# Patient Record
Sex: MALE | Race: WHITE | NOT HISPANIC OR LATINO | Employment: UNEMPLOYED | URBAN - METROPOLITAN AREA
[De-identification: names, ages, dates, MRNs, and addresses within clinical notes are randomized per-mention and may not be internally consistent; named-entity substitution may affect disease eponyms.]

---

## 2021-04-07 ENCOUNTER — HISTORICAL (OUTPATIENT)
Dept: RADIOLOGY | Facility: HOSPITAL | Age: 36
End: 2021-04-07

## 2021-05-12 ENCOUNTER — HISTORICAL (OUTPATIENT)
Dept: ADMINISTRATIVE | Facility: HOSPITAL | Age: 36
End: 2021-05-12

## 2022-04-11 ENCOUNTER — HISTORICAL (OUTPATIENT)
Dept: ADMINISTRATIVE | Facility: HOSPITAL | Age: 37
End: 2022-04-11

## 2022-04-26 VITALS
SYSTOLIC BLOOD PRESSURE: 121 MMHG | HEIGHT: 72 IN | BODY MASS INDEX: 29.68 KG/M2 | WEIGHT: 219.13 LBS | DIASTOLIC BLOOD PRESSURE: 87 MMHG

## 2022-07-15 ENCOUNTER — HOSPITAL ENCOUNTER (EMERGENCY)
Facility: HOSPITAL | Age: 37
Discharge: PSYCHIATRIC OR SUBSTANCE USE FACILITY | End: 2022-07-16
Attending: EMERGENCY MEDICINE
Payer: MEDICAID

## 2022-07-15 DIAGNOSIS — R45.851 SUICIDAL IDEATION: Primary | ICD-10-CM

## 2022-07-15 LAB
ALBUMIN SERPL-MCNC: 4.1 GM/DL (ref 3.5–5)
ALBUMIN/GLOB SERPL: 1.2 RATIO (ref 1.1–2)
ALP SERPL-CCNC: 56 UNIT/L (ref 40–150)
ALT SERPL-CCNC: 35 UNIT/L (ref 0–55)
AMPHET UR QL SCN: POSITIVE
APAP SERPL-MCNC: <17.4 UG/ML (ref 17.4–30)
APPEARANCE UR: CLEAR
AST SERPL-CCNC: 39 UNIT/L (ref 5–34)
BACTERIA #/AREA URNS AUTO: ABNORMAL /HPF
BARBITURATE SCN PRESENT UR: NEGATIVE
BASOPHILS # BLD AUTO: 0.08 X10(3)/MCL (ref 0–0.2)
BASOPHILS NFR BLD AUTO: 1 %
BENZODIAZ UR QL SCN: NEGATIVE
BILIRUB UR QL STRIP.AUTO: NEGATIVE MG/DL
BILIRUBIN DIRECT+TOT PNL SERPL-MCNC: 0.5 MG/DL
BUN SERPL-MCNC: 13.2 MG/DL (ref 8.9–20.6)
CALCIUM SERPL-MCNC: 9.3 MG/DL (ref 8.4–10.2)
CANNABINOIDS UR QL SCN: NEGATIVE
CHLORIDE SERPL-SCNC: 111 MMOL/L (ref 98–107)
CO2 SERPL-SCNC: 18 MMOL/L (ref 22–29)
COCAINE UR QL SCN: NEGATIVE
COLOR UR AUTO: ABNORMAL
CREAT SERPL-MCNC: 0.9 MG/DL (ref 0.73–1.18)
EOSINOPHIL # BLD AUTO: 0.1 X10(3)/MCL (ref 0–0.9)
EOSINOPHIL NFR BLD AUTO: 1.2 %
ERYTHROCYTE [DISTWIDTH] IN BLOOD BY AUTOMATED COUNT: 12.9 % (ref 11.5–17)
ETHANOL SERPL-MCNC: <10 MG/DL
FENTANYL UR QL SCN: POSITIVE
GLOBULIN SER-MCNC: 3.4 GM/DL (ref 2.4–3.5)
GLUCOSE SERPL-MCNC: 91 MG/DL (ref 74–100)
GLUCOSE UR QL STRIP.AUTO: NEGATIVE MG/DL
HCT VFR BLD AUTO: 38.9 % (ref 42–52)
HGB BLD-MCNC: 13.3 GM/DL (ref 14–18)
IMM GRANULOCYTES # BLD AUTO: 0.03 X10(3)/MCL (ref 0–0.04)
IMM GRANULOCYTES NFR BLD AUTO: 0.4 %
KETONES UR QL STRIP.AUTO: ABNORMAL MG/DL
LEUKOCYTE ESTERASE UR QL STRIP.AUTO: NEGATIVE UNIT/L
LYMPHOCYTES # BLD AUTO: 2.32 X10(3)/MCL (ref 0.6–4.6)
LYMPHOCYTES NFR BLD AUTO: 28.2 %
MCH RBC QN AUTO: 30.3 PG (ref 27–31)
MCHC RBC AUTO-ENTMCNC: 34.2 MG/DL (ref 33–36)
MCV RBC AUTO: 88.6 FL (ref 80–94)
MDMA UR QL SCN: NEGATIVE
MONOCYTES # BLD AUTO: 0.71 X10(3)/MCL (ref 0.1–1.3)
MONOCYTES NFR BLD AUTO: 8.6 %
NEUTROPHILS # BLD AUTO: 5 X10(3)/MCL (ref 2.1–9.2)
NEUTROPHILS NFR BLD AUTO: 60.6 %
NITRITE UR QL STRIP.AUTO: NEGATIVE
NRBC BLD AUTO-RTO: 0 %
OPIATES UR QL SCN: NEGATIVE
PCP UR QL: NEGATIVE
PH UR STRIP.AUTO: 6 [PH]
PH UR: 6 [PH] (ref 3–11)
PLATELET # BLD AUTO: 227 X10(3)/MCL (ref 130–400)
PMV BLD AUTO: 9.5 FL (ref 7.4–10.4)
POTASSIUM SERPL-SCNC: 4.9 MMOL/L (ref 3.5–5.1)
PROT SERPL-MCNC: 7.5 GM/DL (ref 6.4–8.3)
PROT UR QL STRIP.AUTO: ABNORMAL MG/DL
RBC # BLD AUTO: 4.39 X10(6)/MCL (ref 4.7–6.1)
RBC #/AREA URNS AUTO: <5 /HPF
RBC UR QL AUTO: NEGATIVE UNIT/L
SALICYLATES SERPL-MCNC: <5 MG/DL
SARS-COV-2 RDRP RESP QL NAA+PROBE: NEGATIVE
SODIUM SERPL-SCNC: 141 MMOL/L (ref 136–145)
SP GR UR STRIP.AUTO: 1.02 (ref 1–1.03)
SPECIFIC GRAVITY, URINE AUTO (.000) (OHS): 1.02 (ref 1–1.03)
SQUAMOUS #/AREA URNS AUTO: <5 /HPF
TSH SERPL-ACNC: 0.18 UIU/ML (ref 0.35–4.94)
UROBILINOGEN UR STRIP-ACNC: 1 MG/DL
WBC # SPEC AUTO: 8.2 X10(3)/MCL (ref 4.5–11.5)
WBC #/AREA URNS AUTO: 6 /HPF

## 2022-07-15 PROCEDURE — 80307 DRUG TEST PRSMV CHEM ANLYZR: CPT | Performed by: NURSE PRACTITIONER

## 2022-07-15 PROCEDURE — 80143 DRUG ASSAY ACETAMINOPHEN: CPT | Performed by: NURSE PRACTITIONER

## 2022-07-15 PROCEDURE — 81001 URINALYSIS AUTO W/SCOPE: CPT | Performed by: NURSE PRACTITIONER

## 2022-07-15 PROCEDURE — 36415 COLL VENOUS BLD VENIPUNCTURE: CPT | Performed by: NURSE PRACTITIONER

## 2022-07-15 PROCEDURE — 99285 EMERGENCY DEPT VISIT HI MDM: CPT | Mod: 25

## 2022-07-15 PROCEDURE — 82077 ASSAY SPEC XCP UR&BREATH IA: CPT | Performed by: NURSE PRACTITIONER

## 2022-07-15 PROCEDURE — 87635 SARS-COV-2 COVID-19 AMP PRB: CPT | Performed by: EMERGENCY MEDICINE

## 2022-07-15 PROCEDURE — 85025 COMPLETE CBC W/AUTO DIFF WBC: CPT | Performed by: NURSE PRACTITIONER

## 2022-07-15 PROCEDURE — 84443 ASSAY THYROID STIM HORMONE: CPT | Performed by: NURSE PRACTITIONER

## 2022-07-15 PROCEDURE — 80053 COMPREHEN METABOLIC PANEL: CPT | Performed by: NURSE PRACTITIONER

## 2022-07-15 PROCEDURE — 80179 DRUG ASSAY SALICYLATE: CPT | Performed by: NURSE PRACTITIONER

## 2022-07-15 NOTE — FIRST PROVIDER EVALUATION
Medical screening exam completed.  I have conducted a focused provider triage encounter, findings are as follows:    Brief history of present illness:  States fall one week ago and neck pain. States suicidal ideations.     There were no vitals filed for this visit.    Pertinent physical exam:  Awake, alert, ambulatory    Brief workup plan:  labs    Preliminary workup initiated; this workup will be continued and followed by the physician or advanced practice provider that is assigned to the patient when roomed.

## 2022-07-15 NOTE — ED PROVIDER NOTES
"Encounter Date: 7/15/2022    SCRIBE #1 NOTE: I, Rachel Ceja, am scribing for, and in the presence of,  Zhao Ocampo. I have scribed the following portions of the note - Other sections scribed: HPI, ROS, physical exam.       History     Chief Complaint   Patient presents with    Suicidal    Neck Pain     Pt presents c/o SI.  Onset x1 week.  No plan.  Denies HI.  Polysubstance abuse, meth and heroin/nasal.  States last usuage x4 days.       38 y/o male presents to the ED with complaints of suicidal ideation. Pt reports that his "life sucks" and that he is addicted to meth and heroin. Pt reports that he last ingested meth and heroin 4 days ago. Pt's sister at bedside notes that he also abuses his prescription pain meds and that he falls frequently. She also notes that he has insomnia and neck pain.     The history is provided by the patient and a relative. No  was used.   General Illness   The current episode started just prior to arrival. Associated symptoms include neck pain. Pertinent negatives include no fever, no abdominal pain, no constipation, no diarrhea, no nausea, no vomiting, no congestion, no ear pain, no headaches, no cough, no shortness of breath, no wheezing, no rash and no discharge.     Review of patient's allergies indicates:  No Known Allergies  No past medical history on file.  No past surgical history on file.  No family history on file.     Review of Systems   Constitutional: Negative for chills and fever.   HENT: Negative for congestion and ear pain.    Eyes: Negative for discharge.   Respiratory: Negative for cough, shortness of breath and wheezing.    Cardiovascular: Negative for chest pain and leg swelling.   Gastrointestinal: Negative for abdominal pain, constipation, diarrhea, nausea and vomiting.   Genitourinary: Negative for dysuria, flank pain and frequency.   Musculoskeletal: Positive for neck pain. Negative for back pain and joint swelling.   Skin: " Negative for rash.   Neurological: Negative for dizziness, weakness and headaches.   Psychiatric/Behavioral: Positive for suicidal ideas. Negative for agitation, confusion and hallucinations.       Physical Exam     Initial Vitals [07/15/22 1419]   BP Pulse Resp Temp SpO2   127/78 93 14 98.8 °F (37.1 °C) 98 %      MAP       --         Physical Exam    Nursing note and vitals reviewed.  Constitutional: He appears well-developed and well-nourished. No distress.   HENT:   Head: Normocephalic and atraumatic.   Eyes: Conjunctivae are normal.   Cardiovascular: Normal rate.   Pulmonary/Chest: No respiratory distress. He has no wheezes. He has no rhonchi. He exhibits no tenderness.   Abdominal: Abdomen is soft. He exhibits no distension. There is no abdominal tenderness. There is no rebound and no guarding.   Musculoskeletal:         General: Normal range of motion.      Comments: No midline tenderness to C/T spine     Neurological: He is alert and oriented to person, place, and time. He has normal strength.   Skin: Skin is warm and dry.   Psychiatric:   +SI  Flat affect         ED Course   Procedures  Labs Reviewed   DRUG SCREEN, URINE (BEAKER) - Abnormal; Notable for the following components:       Result Value    Amphetamines, Urine Positive (*)     Fentanyl, Urine Positive (*)     All other components within normal limits    Narrative:     Cut off concentrations:    Amphetamines - 1000 ng/ml  Barbiturates - 200 ng/ml  Benzodiazepine - 200 ng/ml  Cannabinoids (THC) - 50 ng/ml  Cocaine - 300 ng/ml  Fentanyl - 1.0 ng/ml  MDMA - 500 ng/ml  Opiates - 300 ng/ml   Phencyclidine (PCP) - 25 ng/ml    Specimen submitted for drug analysis and tested for pH and specific gravity in order to evaluate sample integrity. Suspect tampering if specific gravity is <1.003 and/or pH is not within the range of 4.5 - 8.0  False negatives may result form substances such as bleach added to urine.  False positives may result for the presence of a  substance with similar chemical structure to the drug or its metabolite.    This test provides only a PRELIMINARY analytical test result. A more specific alternate chemical method must be used in order to obtain a confirmed analytical result. Gas chromatography/mass spectrometry (GC/MS) is the preferred confirmatory method. Other chemical confirmation methods are available. Clinical consideration and professional judgement should be applied to any drug of abuse test result, particularly when preliminary positive results are used.    Positive results will be confirmed only at the physicians request. Unconfirmed screening results are to be used only for medical purposes (treatment).        ACETAMINOPHEN LEVEL - Abnormal; Notable for the following components:    Acetaminophen Level <17.4 (*)     All other components within normal limits   COMPREHENSIVE METABOLIC PANEL - Abnormal; Notable for the following components:    Chloride 111 (*)     Carbon Dioxide 18 (*)     Aspartate Aminotransferase 39 (*)     All other components within normal limits   TSH - Abnormal; Notable for the following components:    Thyroid Stimulating Hormone 0.1817 (*)     All other components within normal limits   URINALYSIS, REFLEX TO URINE CULTURE - Abnormal; Notable for the following components:    Color, UA Dark Yellow (*)     Protein, UA Trace (*)     Ketones, UA 1+ (*)     All other components within normal limits   CBC WITH DIFFERENTIAL - Abnormal; Notable for the following components:    RBC 4.39 (*)     Hgb 13.3 (*)     Hct 38.9 (*)     All other components within normal limits   URINALYSIS, MICROSCOPIC - Abnormal; Notable for the following components:    WBC, UA 6 (*)     All other components within normal limits   ALCOHOL,MEDICAL (ETHANOL) - Normal   SARS-COV-2 RNA AMPLIFICATION, QUAL - Normal   CBC W/ AUTO DIFFERENTIAL    Narrative:     The following orders were created for panel order CBC Auto Differential.  Procedure                                Abnormality         Status                     ---------                               -----------         ------                     CBC with Differential[[id number]]        Abnormal            Final result                 Please view results for these tests on the individual orders.   SALICYLATE LEVEL          Imaging Results          CT Cervical Spine Without Contrast (Final result)  Result time 07/15/22 15:39:56    Final result by Lakeisha Camacho MD (07/15/22 15:39:56)                 Impression:      Loss of the normal lordotic curve of the cervical spine and torticollis to the left most likely related to spasm but otherwise unremarkable with no evidence of acute fracture or dislocation seen      Electronically signed by: Lakeisha Camacho  Date:    07/15/2022  Time:    15:39             Narrative:    EXAMINATION:  CT CERVICAL SPINE WITHOUT CONTRAST    CLINICAL HISTORY:  Neck trauma, abnormal mental status or neuro exam (Ped 3-15y);    TECHNIQUE:  Low dose axial images, sagittal and coronal reformations were performed though the cervical spine.  Contrast was not administered.    COMPARISON:  None    FINDINGS:  The vertebral body heights are well maintained. There is some loss of the normal lordotic curve cervical spine with torticollis to the left most likely related to spasm. No fracture is seen. No dislocation is seen. The odontoid and lateral masses appear grossly unremarkable.                               CT Head Without Contrast (Final result)  Result time 07/15/22 15:41:35    Final result by Lakeisha Camacho MD (07/15/22 15:41:35)                 Impression:      No acute abnormality seen      Electronically signed by: Lakeisha Camacho  Date:    07/15/2022  Time:    15:41             Narrative:    EXAMINATION:  CT HEAD WITHOUT CONTRAST    CLINICAL HISTORY:  Head trauma, moderate-severe;    TECHNIQUE:  Multiple axial images were obtained from the base of the brain to the  vertex without contrast administration.  Sagittal and coronal reconstructions were performed..    COMPARISON:  [facility]    FINDINGS:  There is no intracranial mass or lesion seen.  No hemorrhage is seen.  No acute infarct is seen.  There are some punctate areas of lacunar infarct in the basal ganglia bilaterally the ventricles and basilar cisterns appear normal.  Brain parenchyma appears grossly unremarkable.    Posterior fossa appears normal.  The calvarium is intact.  The paranasal sinuses appear grossly unremarkable.                               X-Ray Cervical Spine 2 or 3 Views (Final result)  Result time 07/15/22 15:34:54    Final result by Nikkie Covington MD (07/15/22 15:34:54)                 Impression:      No acute abnormality identified.      Electronically signed by: Nikkie Covington  Date:    07/15/2022  Time:    15:34             Narrative:    EXAMINATION:  XR CERVICAL SPINE 2 OR 3 VIEWS    CLINICAL HISTORY:  fall;    COMPARISON:  None.    FINDINGS:  There is reversal of normal cervical lordosis.  The vertebral body heights are maintained.  There is mild disc height loss at C5-C7 small multilevel marginal osteophytes.  The soft tissues are unremarkable.                                 Medications - No data to display             Attending Attestation:           Physician Attestation for Scribe:  Physician Attestation Statement for Scribe #1: I, Zhao Ocampo, reviewed documentation, as scribed by Rachel Ceja in my presence, and it is both accurate and complete.                 Medically cleared for psychiatry placement: 7/15/2022  7:25 PM    Clinical Impression:   Final diagnoses:  [R45.851] Suicidal ideation (Primary)          ED Disposition Condition    Transfer to Psych Facility         ED Prescriptions     None        Follow-up Information    None          Zhao Ocampo MD  07/15/22 1925

## 2022-07-16 VITALS
BODY MASS INDEX: 24.65 KG/M2 | RESPIRATION RATE: 16 BRPM | WEIGHT: 180 LBS | HEART RATE: 78 BPM | TEMPERATURE: 98 F | SYSTOLIC BLOOD PRESSURE: 151 MMHG | DIASTOLIC BLOOD PRESSURE: 91 MMHG | OXYGEN SATURATION: 98 %

## 2022-07-16 PROCEDURE — 25000003 PHARM REV CODE 250: Performed by: EMERGENCY MEDICINE

## 2022-07-16 RX ORDER — LORAZEPAM 1 MG/1
1 TABLET ORAL
Status: COMPLETED | OUTPATIENT
Start: 2022-07-16 | End: 2022-07-16

## 2022-07-16 RX ADMIN — LORAZEPAM 1 MG: 1 TABLET ORAL at 12:07

## 2022-07-18 NOTE — PROGRESS NOTES
Miguelina with [facility] called inquiring if patient had follow up recommendations. Advised no follow up appointments listed.

## 2022-08-07 ENCOUNTER — HOSPITAL ENCOUNTER (INPATIENT)
Facility: HOSPITAL | Age: 37
LOS: 5 days | Discharge: HOME | DRG: 897 | End: 2022-08-12
Attending: PSYCHIATRY & NEUROLOGY | Admitting: PSYCHIATRY & NEUROLOGY
Payer: MEDICAID

## 2022-08-07 DIAGNOSIS — F32.A DEPRESSION: ICD-10-CM

## 2022-08-07 PROBLEM — I10 ESSENTIAL HYPERTENSION: Chronic | Status: ACTIVE | Noted: 2022-08-07

## 2022-08-07 PROCEDURE — 11400000 HC PSYCH PRIVATE ROOM

## 2022-08-07 PROCEDURE — S4991 NICOTINE PATCH NONLEGEND: HCPCS | Performed by: PSYCHIATRY & NEUROLOGY

## 2022-08-07 PROCEDURE — 25000003 PHARM REV CODE 250: Performed by: PEDIATRICS

## 2022-08-07 PROCEDURE — 25000003 PHARM REV CODE 250: Performed by: PSYCHIATRY & NEUROLOGY

## 2022-08-07 RX ORDER — LOPERAMIDE HYDROCHLORIDE 2 MG/1
2 CAPSULE ORAL EVERY 4 HOURS PRN
Status: DISCONTINUED | OUTPATIENT
Start: 2022-08-07 | End: 2022-08-12 | Stop reason: HOSPADM

## 2022-08-07 RX ORDER — ADHESIVE BANDAGE
30 BANDAGE TOPICAL DAILY PRN
Status: DISCONTINUED | OUTPATIENT
Start: 2022-08-07 | End: 2022-08-12 | Stop reason: HOSPADM

## 2022-08-07 RX ORDER — ONDANSETRON 4 MG/1
8 TABLET, ORALLY DISINTEGRATING ORAL EVERY 8 HOURS PRN
Status: DISCONTINUED | OUTPATIENT
Start: 2022-08-07 | End: 2022-08-12 | Stop reason: HOSPADM

## 2022-08-07 RX ORDER — LORAZEPAM 2 MG/ML
2 INJECTION INTRAMUSCULAR EVERY 6 HOURS PRN
Status: DISCONTINUED | OUTPATIENT
Start: 2022-08-07 | End: 2022-08-12 | Stop reason: HOSPADM

## 2022-08-07 RX ORDER — HYDROXYZINE HYDROCHLORIDE 50 MG/1
50 TABLET, FILM COATED ORAL EVERY 4 HOURS PRN
Status: DISCONTINUED | OUTPATIENT
Start: 2022-08-07 | End: 2022-08-12 | Stop reason: HOSPADM

## 2022-08-07 RX ORDER — MAG HYDROX/ALUMINUM HYD/SIMETH 200-200-20
30 SUSPENSION, ORAL (FINAL DOSE FORM) ORAL EVERY 6 HOURS PRN
Status: DISCONTINUED | OUTPATIENT
Start: 2022-08-07 | End: 2022-08-12 | Stop reason: HOSPADM

## 2022-08-07 RX ORDER — LISINOPRIL 10 MG/1
20 TABLET ORAL DAILY
Status: DISCONTINUED | OUTPATIENT
Start: 2022-08-07 | End: 2022-08-08

## 2022-08-07 RX ORDER — ACETAMINOPHEN 325 MG/1
650 TABLET ORAL EVERY 6 HOURS PRN
Status: DISCONTINUED | OUTPATIENT
Start: 2022-08-07 | End: 2022-08-12 | Stop reason: HOSPADM

## 2022-08-07 RX ORDER — DICYCLOMINE HYDROCHLORIDE 10 MG/1
10 CAPSULE ORAL 3 TIMES DAILY PRN
Status: ACTIVE | OUTPATIENT
Start: 2022-08-07 | End: 2022-08-10

## 2022-08-07 RX ORDER — LORAZEPAM 1 MG/1
2 TABLET ORAL EVERY 6 HOURS PRN
Status: DISCONTINUED | OUTPATIENT
Start: 2022-08-07 | End: 2022-08-12 | Stop reason: HOSPADM

## 2022-08-07 RX ORDER — TRAZODONE HYDROCHLORIDE 100 MG/1
100 TABLET ORAL NIGHTLY PRN
Status: DISCONTINUED | OUTPATIENT
Start: 2022-08-07 | End: 2022-08-12 | Stop reason: HOSPADM

## 2022-08-07 RX ORDER — HALOPERIDOL 5 MG/ML
10 INJECTION INTRAMUSCULAR EVERY 6 HOURS PRN
Status: DISCONTINUED | OUTPATIENT
Start: 2022-08-07 | End: 2022-08-12 | Stop reason: HOSPADM

## 2022-08-07 RX ORDER — IBUPROFEN 200 MG
1 TABLET ORAL DAILY
Status: DISCONTINUED | OUTPATIENT
Start: 2022-08-07 | End: 2022-08-12 | Stop reason: HOSPADM

## 2022-08-07 RX ORDER — DIPHENHYDRAMINE HYDROCHLORIDE 50 MG/ML
50 INJECTION INTRAMUSCULAR; INTRAVENOUS EVERY 6 HOURS
Status: DISCONTINUED | OUTPATIENT
Start: 2022-08-07 | End: 2022-08-07

## 2022-08-07 RX ORDER — DIPHENHYDRAMINE HCL 50 MG
50 CAPSULE ORAL EVERY 6 HOURS PRN
Status: DISCONTINUED | OUTPATIENT
Start: 2022-08-07 | End: 2022-08-12 | Stop reason: HOSPADM

## 2022-08-07 RX ORDER — HALOPERIDOL 5 MG/1
10 TABLET ORAL EVERY 6 HOURS PRN
Status: DISCONTINUED | OUTPATIENT
Start: 2022-08-07 | End: 2022-08-12 | Stop reason: HOSPADM

## 2022-08-07 RX ORDER — PANTOPRAZOLE SODIUM 40 MG/1
40 TABLET, DELAYED RELEASE ORAL DAILY
Status: DISCONTINUED | OUTPATIENT
Start: 2022-08-07 | End: 2022-08-12 | Stop reason: HOSPADM

## 2022-08-07 RX ORDER — CLONIDINE HYDROCHLORIDE 0.1 MG/1
0.1 TABLET ORAL 3 TIMES DAILY
Status: DISPENSED | OUTPATIENT
Start: 2022-08-07 | End: 2022-08-10

## 2022-08-07 RX ADMIN — DIPHENHYDRAMINE HYDROCHLORIDE 50 MG: 50 CAPSULE ORAL at 05:08

## 2022-08-07 RX ADMIN — NICOTINE 1 PATCH: 21 PATCH, EXTENDED RELEASE TRANSDERMAL at 11:08

## 2022-08-07 RX ADMIN — ACETAMINOPHEN 325MG 650 MG: 325 TABLET ORAL at 05:08

## 2022-08-07 RX ADMIN — ONDANSETRON 8 MG: 4 TABLET, ORALLY DISINTEGRATING ORAL at 12:08

## 2022-08-07 RX ADMIN — HALOPERIDOL 10 MG: 5 TABLET ORAL at 05:08

## 2022-08-07 RX ADMIN — CLONIDINE HYDROCHLORIDE 0.1 MG: 0.1 TABLET ORAL at 11:08

## 2022-08-07 RX ADMIN — LISINOPRIL 20 MG: 10 TABLET ORAL at 08:08

## 2022-08-07 RX ADMIN — PANTOPRAZOLE SODIUM 40 MG: 40 TABLET, DELAYED RELEASE ORAL at 08:08

## 2022-08-07 RX ADMIN — LORAZEPAM 2 MG: 1 TABLET ORAL at 05:08

## 2022-08-07 RX ADMIN — TRAZODONE HYDROCHLORIDE 100 MG: 100 TABLET ORAL at 10:08

## 2022-08-07 RX ADMIN — CLONIDINE HYDROCHLORIDE 0.1 MG: 0.1 TABLET ORAL at 10:08

## 2022-08-07 NOTE — NURSING
Patient becoming more anxious and agitated.  De-escalation attempts repeatedly with no affect.  Patient demanding to leave and go to another facility because staff here doesn't know how to order the right medicine for detox ing people.  Patient getting louder and more vulgar.  Medicated with Benadryl 50 mg, Ativan 2 mg and Haldol 10mg orally     1800--Sleeping comfortably. No distress

## 2022-08-07 NOTE — H&P
[facility]  History & Physical    Subjective:      Chief Complaint/Reason for Admission: polysubstance abuse     Keshav Godfrey is a 37 y.o. male. Opiates and methamphetamine abuse for over six months now     Past Medical History:   Diagnosis Date    Bipolar 1 disorder      No past surgical history on file.  No family history on file.  Social History     Tobacco Use    Smoking status: Current Every Day Smoker     Types: Cigarettes   Substance Use Topics    Alcohol use: Not Currently    Drug use: Yes     Types: Methamphetamines     Comment: Methadone       No medications prior to admission.     Review of patient's allergies indicates:   Allergen Reactions    Buspirone     Codeine     Hydroxyzine     Penicillins         Review of Systems   Constitutional: Negative.    HENT: Negative.    Eyes: Negative.    Respiratory: Negative.    Cardiovascular: Negative.    Gastrointestinal: Negative.    Genitourinary: Negative.    Musculoskeletal: Negative.    Skin: Negative.    Neurological: Negative.    Endo/Heme/Allergies: Negative.    Psychiatric/Behavioral: Positive for depression and substance abuse. Negative for hallucinations and suicidal ideas.       Objective:      Vital Signs (Most Recent)  Temp: 97.7 °F (36.5 °C) (08/07/22 0358)  Pulse: 70 (08/07/22 0358)  Resp: 20 (08/07/22 0358)  BP: (!) 152/100 (08/07/22 0358)  SpO2: 99 % (08/07/22 0358)    Vital Signs Range (Last 24H):  Temp:  [97.7 °F (36.5 °C)-98.6 °F (37 °C)]   Pulse:  [70-93]   Resp:  [18-20]   BP: (142-152)/()   SpO2:  [98 %-99 %]     Physical Exam  HENT:      Head: Normocephalic.      Right Ear: Tympanic membrane normal.      Left Ear: Tympanic membrane normal.      Nose: Nose normal.      Mouth/Throat:      Mouth: Mucous membranes are moist.   Eyes:      Extraocular Movements: Extraocular movements intact.      Pupils: Pupils are equal, round, and reactive to light.   Cardiovascular:      Rate and  Rhythm: Normal rate and regular rhythm.   Pulmonary:      Effort: Pulmonary effort is normal.   Abdominal:      General: Abdomen is flat.   Musculoskeletal:         General: Normal range of motion.   Skin:     General: Skin is warm.   Neurological:      General: No focal deficit present.      Mental Status: He is alert and oriented to person, place, and time.      Comments: Vision normal   Hearing normal   EOM intact   Face muscles normal  Facial sensation normal   Shrugs shoulders  Tongue midline            Data Review:    Recent Results (from the past 48 hour(s))   Urinalysis, Reflex to Urine Culture Urine, Clean Catch    Collection Time: 08/06/22  6:56 PM    Specimen: Urine   Result Value Ref Range    Color, UA Dark Yellow (A) Yellow, Colorless, Other, Clear    Appearance, UA Cloudy (A) Clear    Specific Gravity, UA 1.029 1.001 - 1.030    pH, UA 5.5 5.0, 5.5, 6.0, 6.5, 7.0, 7.5, 8.0, 8.5    Protein, UA 2+ (A) Negative, 300  mg/dL    Glucose, UA 1+ (A) Negative, Normal mg/dL    Ketones, UA Trace (A) Negative, +1, +2, +3, +4, +5, >=160, >=80 mg/dL    Blood, UA 1+ (A) Negative unit/L    Bilirubin, UA 1+ (A) Negative mg/dL    Urobilinogen, UA 1.0 0.2, 1.0, Normal mg/dL    Nitrites, UA Negative Negative    Leukocyte Esterase, UA Negative Negative, 75  unit/L   Drug Screen, Urine    Collection Time: 08/06/22  6:56 PM   Result Value Ref Range    Amphetamines, Urine Positive (A) Negative    Barbituates, Urine Negative Negative    Benzodiazepine, Urine Negative Negative    Cannabinoids, Urine Positive (A) Negative    Cocaine, Urine Negative Negative    Fentanyl, Urine Positive (A) Negative    MDMA, Urine Negative Negative    Opiates, Urine Positive (A) Negative    Phencyclidine, Urine Negative Negative    pH, Urine 5.5 3.0 - 11.0    Specific Gravity, Urine Auto 1.029 1.001 - 1.035   Urinalysis, Microscopic    Collection Time: 08/06/22  6:56 PM   Result Value Ref Range    RBC, UA <5 <=5 /HPF    WBC, UA 43 (H) <=5 /HPF     Squamous Epithelial Cells, UA >36 (H) <=5 /HPF    Bacteria, UA None Seen None Seen, Rare, Occasional /HPF   Comprehensive metabolic panel    Collection Time: 08/06/22  7:04 PM   Result Value Ref Range    Sodium Level 138 136 - 145 mmol/L    Potassium Level 3.2 (L) 3.5 - 5.1 mmol/L    Chloride 105 98 - 107 mmol/L    Carbon Dioxide 20 (L) 22 - 29 mmol/L    Glucose Level 104 (H) 74 - 100 mg/dL    Blood Urea Nitrogen 15.3 8.9 - 20.6 mg/dL    Creatinine 1.69 (H) 0.73 - 1.18 mg/dL    Calcium Level Total 9.3 8.4 - 10.2 mg/dL    Protein Total 7.5 6.4 - 8.3 gm/dL    Albumin Level 4.5 3.5 - 5.0 gm/dL    Globulin 3.0 2.4 - 3.5 gm/dL    Albumin/Globulin Ratio 1.5 1.1 - 2.0 ratio    Bilirubin Total 1.0 <=1.5 mg/dL    Alkaline Phosphatase 90 40 - 150 unit/L    Alanine Aminotransferase 61 (H) 0 - 55 unit/L    Aspartate Aminotransferase 29 5 - 34 unit/L    Estimated GFR-Non  48 mls/min/1.73/m2   TSH    Collection Time: 08/06/22  7:04 PM   Result Value Ref Range    Thyroid Stimulating Hormone 1.7065 0.3500 - 4.9400 uIU/mL   Ethanol    Collection Time: 08/06/22  7:04 PM   Result Value Ref Range    Ethanol Level <10.0 <=10.0 mg/dL   Acetaminophen level    Collection Time: 08/06/22  7:04 PM   Result Value Ref Range    Acetaminophen Level <17.4 (L) 17.4 - 30.0 ug/ml   Salicylate level    Collection Time: 08/06/22  7:04 PM   Result Value Ref Range    Salicylate Level <5.0 mg/dL   CBC with Differential    Collection Time: 08/06/22  7:04 PM   Result Value Ref Range    WBC 9.6 4.5 - 11.5 x10(3)/mcL    RBC 4.78 4.70 - 6.10 x10(6)/mcL    Hgb 14.0 14.0 - 18.0 gm/dL    Hct 39.9 (L) 42.0 - 52.0 %    MCV 83.5 80.0 - 94.0 fL    MCH 29.3 27.0 - 31.0 pg    MCHC 35.1 33.0 - 36.0 mg/dL    RDW 12.8 11.5 - 17.0 %    Platelet 306 130 - 400 x10(3)/mcL    MPV 9.2 7.4 - 10.4 fL    Neut % 42.9 %    Lymph % 43.3 %    Mono % 11.6 %    Eos % 1.0 %    Basophil % 0.7 %    Lymph # 4.14 0.6 - 4.6 x10(3)/mcL    Neut # 4.1 2.1 - 9.2 x10(3)/mcL    Mono  # 1.11 0.1 - 1.3 x10(3)/mcL    Eos # 0.10 0 - 0.9 x10(3)/mcL    Baso # 0.07 0 - 0.2 x10(3)/mcL    IG# 0.05 (H) 0 - 0.04 x10(3)/mcL    IG% 0.5 %    NRBC% 0.0 %   COVID-19 Rapid Screening    Collection Time: 08/06/22 10:29 PM   Result Value Ref Range    SARS COV-2 MOLECULAR Negative Negative        No results found.       Assessment and Plan       Polysubstance abuse   Essential hypertension

## 2022-08-07 NOTE — NURSING
Pt admitted for SI and drug abuse. Pt presents to unit angry and hostile towards staff members. Refuses to sign consents or answer and admit questions at this time. Pt states he is angry because he was told that  He would be able to smoke on arrival to the unit. Will continue to monitor.

## 2022-08-07 NOTE — PLAN OF CARE
Problem: Adult Inpatient Plan of Care  Goal: Plan of Care Review  Outcome: Ongoing, Progressing  Goal: Patient-Specific Goal (Individualized)  Outcome: Ongoing, Progressing  Goal: Absence of Hospital-Acquired Illness or Injury  Outcome: Ongoing, Progressing  Goal: Optimal Comfort and Wellbeing  Outcome: Ongoing, Progressing  Goal: Readiness for Transition of Care  Outcome: Ongoing, Progressing     Problem: Activity and Energy Impairment (Depressive Signs/Symptoms)  Goal: Optimized Energy Level (Depressive Signs/Symptoms)  Outcome: Ongoing, Progressing     Problem: Cognitive Impairment (Depressive Signs/Symptoms)  Goal: Optimized Cognitive Function  Outcome: Ongoing, Progressing     Problem: Decreased Participation/Engagement (Depressive Signs/Symptoms)  Goal: Increased Participation and Engagement (Depressive Signs/Symptoms)  Outcome: Ongoing, Progressing     Problem: Feelings of Worthlessness, Hopelessness or Excessive Guilt (Depressive Signs/Symptoms)  Goal: Enhanced Self-Esteem and Confidence (Depressive Signs/Symptoms)  Outcome: Ongoing, Progressing     Problem: Mood Impairment (Depressive Signs/Symptoms)  Goal: Improved Mood Symptoms (Depressive Signs/Symptoms)  Outcome: Ongoing, Progressing     Problem: Nutrition Imbalance (Depressive Signs/Symptoms)  Goal: Optimized Nutrition Intake  Outcome: Ongoing, Progressing     Problem: Psychomotor Impairment (Depressive Signs/Symptoms)  Goal: Improved Psychomotor Symptoms (Depressive Signs/Symptoms)  Outcome: Ongoing, Progressing     Problem: Sleep Disturbance (Depressive Signs/Symptoms)  Goal: Improved Sleep (Depressive Signs/Symptoms)  Outcome: Ongoing, Progressing     Problem: Social, Occupational or Functional Impairment (Depressive Signs/Symptoms)  Goal: Enhanced Social, Occupational or Functional Skills (Depressive Signs/Symptoms)  Outcome: Ongoing, Progressing

## 2022-08-08 LAB
ALBUMIN SERPL-MCNC: 4.3 GM/DL (ref 3.5–5)
ALBUMIN/GLOB SERPL: 1.5 RATIO (ref 1.1–2)
ALP SERPL-CCNC: 89 UNIT/L (ref 40–150)
ALT SERPL-CCNC: 48 UNIT/L (ref 0–55)
AST SERPL-CCNC: 22 UNIT/L (ref 5–34)
BASOPHILS # BLD AUTO: 0.06 X10(3)/MCL (ref 0–0.2)
BASOPHILS NFR BLD AUTO: 1 %
BILIRUBIN DIRECT+TOT PNL SERPL-MCNC: 1.5 MG/DL
BUN SERPL-MCNC: 20.4 MG/DL (ref 8.9–20.6)
CALCIUM SERPL-MCNC: 9.6 MG/DL (ref 8.4–10.2)
CHLORIDE SERPL-SCNC: 103 MMOL/L (ref 98–107)
CHOLEST SERPL-MCNC: 257 MG/DL
CHOLEST/HDLC SERPL: 10 {RATIO} (ref 0–5)
CO2 SERPL-SCNC: 23 MMOL/L (ref 22–29)
CREAT SERPL-MCNC: 1.02 MG/DL (ref 0.73–1.18)
EOSINOPHIL # BLD AUTO: 0.17 X10(3)/MCL (ref 0–0.9)
EOSINOPHIL NFR BLD AUTO: 2.7 %
ERYTHROCYTE [DISTWIDTH] IN BLOOD BY AUTOMATED COUNT: 12.4 % (ref 11.5–17)
GFR SERPLBLD CREATININE-BSD FMLA CKD-EPI: >60 MLS/MIN/1.73/M2
GLOBULIN SER-MCNC: 2.9 GM/DL (ref 2.4–3.5)
GLUCOSE SERPL-MCNC: 90 MG/DL (ref 74–100)
HCT VFR BLD AUTO: 44.1 % (ref 42–52)
HDLC SERPL-MCNC: 25 MG/DL (ref 35–60)
HGB BLD-MCNC: 15.7 GM/DL (ref 14–18)
IMM GRANULOCYTES # BLD AUTO: 0.03 X10(3)/MCL (ref 0–0.04)
IMM GRANULOCYTES NFR BLD AUTO: 0.5 %
LDLC SERPL CALC-MCNC: 169 MG/DL (ref 50–140)
LYMPHOCYTES # BLD AUTO: 2.92 X10(3)/MCL (ref 0.6–4.6)
LYMPHOCYTES NFR BLD AUTO: 47.2 %
MCH RBC QN AUTO: 30 PG (ref 27–31)
MCHC RBC AUTO-ENTMCNC: 35.6 MG/DL (ref 33–36)
MCV RBC AUTO: 84.3 FL (ref 80–94)
MONOCYTES # BLD AUTO: 0.61 X10(3)/MCL (ref 0.1–1.3)
MONOCYTES NFR BLD AUTO: 9.9 %
NEUTROPHILS # BLD AUTO: 2.4 X10(3)/MCL (ref 2.1–9.2)
NEUTROPHILS NFR BLD AUTO: 38.7 %
NRBC BLD AUTO-RTO: 0 %
PLATELET # BLD AUTO: 283 X10(3)/MCL (ref 130–400)
PMV BLD AUTO: 9.6 FL (ref 7.4–10.4)
POTASSIUM SERPL-SCNC: 4.1 MMOL/L (ref 3.5–5.1)
PROT SERPL-MCNC: 7.2 GM/DL (ref 6.4–8.3)
RBC # BLD AUTO: 5.23 X10(6)/MCL (ref 4.7–6.1)
SODIUM SERPL-SCNC: 140 MMOL/L (ref 136–145)
T PALLIDUM AB SER QL: REACTIVE
TRIGL SERPL-MCNC: 313 MG/DL (ref 34–140)
TSH SERPL-ACNC: 1.03 UIU/ML (ref 0.35–4.94)
VLDLC SERPL CALC-MCNC: 63 MG/DL
WBC # SPEC AUTO: 6.2 X10(3)/MCL (ref 4.5–11.5)

## 2022-08-08 PROCEDURE — 25000003 PHARM REV CODE 250: Performed by: FAMILY MEDICINE

## 2022-08-08 PROCEDURE — S4991 NICOTINE PATCH NONLEGEND: HCPCS | Performed by: PSYCHIATRY & NEUROLOGY

## 2022-08-08 PROCEDURE — 11400000 HC PSYCH PRIVATE ROOM

## 2022-08-08 PROCEDURE — 25000003 PHARM REV CODE 250: Performed by: PSYCHIATRY & NEUROLOGY

## 2022-08-08 PROCEDURE — 25000003 PHARM REV CODE 250: Performed by: PEDIATRICS

## 2022-08-08 PROCEDURE — 86780 TREPONEMA PALLIDUM: CPT | Performed by: PSYCHIATRY & NEUROLOGY

## 2022-08-08 PROCEDURE — 86592 SYPHILIS TEST NON-TREP QUAL: CPT | Performed by: PSYCHIATRY & NEUROLOGY

## 2022-08-08 PROCEDURE — 36415 COLL VENOUS BLD VENIPUNCTURE: CPT | Performed by: PSYCHIATRY & NEUROLOGY

## 2022-08-08 PROCEDURE — 80061 LIPID PANEL: CPT | Performed by: PSYCHIATRY & NEUROLOGY

## 2022-08-08 PROCEDURE — 63600175 PHARM REV CODE 636 W HCPCS: Performed by: PSYCHIATRY & NEUROLOGY

## 2022-08-08 PROCEDURE — 80053 COMPREHEN METABOLIC PANEL: CPT | Performed by: PSYCHIATRY & NEUROLOGY

## 2022-08-08 PROCEDURE — 84443 ASSAY THYROID STIM HORMONE: CPT | Performed by: PSYCHIATRY & NEUROLOGY

## 2022-08-08 PROCEDURE — 85025 COMPLETE CBC W/AUTO DIFF WBC: CPT | Performed by: PSYCHIATRY & NEUROLOGY

## 2022-08-08 RX ORDER — AMLODIPINE BESYLATE 5 MG/1
5 TABLET ORAL DAILY
Status: DISCONTINUED | OUTPATIENT
Start: 2022-08-08 | End: 2022-08-12 | Stop reason: HOSPADM

## 2022-08-08 RX ORDER — DIVALPROEX SODIUM 250 MG/1
250 TABLET, DELAYED RELEASE ORAL 2 TIMES DAILY
Status: DISCONTINUED | OUTPATIENT
Start: 2022-08-08 | End: 2022-08-12 | Stop reason: HOSPADM

## 2022-08-08 RX ORDER — LISINOPRIL 10 MG/1
40 TABLET ORAL DAILY
Status: DISCONTINUED | OUTPATIENT
Start: 2022-08-09 | End: 2022-08-12 | Stop reason: HOSPADM

## 2022-08-08 RX ORDER — QUETIAPINE FUMARATE 300 MG/1
300 TABLET, FILM COATED ORAL NIGHTLY
Status: DISCONTINUED | OUTPATIENT
Start: 2022-08-08 | End: 2022-08-12 | Stop reason: HOSPADM

## 2022-08-08 RX ORDER — HYDROXYZINE HYDROCHLORIDE 25 MG/1
25 TABLET, FILM COATED ORAL 3 TIMES DAILY
Status: DISCONTINUED | OUTPATIENT
Start: 2022-08-08 | End: 2022-08-12 | Stop reason: HOSPADM

## 2022-08-08 RX ADMIN — HYDROXYZINE HYDROCHLORIDE 50 MG: 50 TABLET, FILM COATED ORAL at 08:08

## 2022-08-08 RX ADMIN — HYDROXYZINE HYDROCHLORIDE 25 MG: 25 TABLET ORAL at 08:08

## 2022-08-08 RX ADMIN — ACETAMINOPHEN 325MG 650 MG: 325 TABLET ORAL at 05:08

## 2022-08-08 RX ADMIN — HYDROXYZINE HYDROCHLORIDE 25 MG: 25 TABLET ORAL at 03:08

## 2022-08-08 RX ADMIN — CLONIDINE HYDROCHLORIDE 0.1 MG: 0.1 TABLET ORAL at 12:08

## 2022-08-08 RX ADMIN — PANTOPRAZOLE SODIUM 40 MG: 40 TABLET, DELAYED RELEASE ORAL at 08:08

## 2022-08-08 RX ADMIN — LISINOPRIL 20 MG: 10 TABLET ORAL at 08:08

## 2022-08-08 RX ADMIN — HALOPERIDOL 10 MG: 5 TABLET ORAL at 10:08

## 2022-08-08 RX ADMIN — NICOTINE 1 PATCH: 21 PATCH, EXTENDED RELEASE TRANSDERMAL at 09:08

## 2022-08-08 RX ADMIN — HALOPERIDOL LACTATE 10 MG: 5 INJECTION, SOLUTION INTRAMUSCULAR at 05:08

## 2022-08-08 RX ADMIN — ONDANSETRON 8 MG: 4 TABLET, ORALLY DISINTEGRATING ORAL at 08:08

## 2022-08-08 RX ADMIN — QUETIAPINE FUMARATE 300 MG: 300 TABLET ORAL at 08:08

## 2022-08-08 RX ADMIN — CLONIDINE HYDROCHLORIDE 0.1 MG: 0.1 TABLET ORAL at 08:08

## 2022-08-08 RX ADMIN — DIVALPROEX SODIUM 250 MG: 250 TABLET, DELAYED RELEASE ORAL at 12:08

## 2022-08-08 RX ADMIN — DIPHENHYDRAMINE HYDROCHLORIDE 50 MG: 50 CAPSULE ORAL at 10:08

## 2022-08-08 RX ADMIN — TRAZODONE HYDROCHLORIDE 100 MG: 100 TABLET ORAL at 08:08

## 2022-08-08 RX ADMIN — DIVALPROEX SODIUM 250 MG: 250 TABLET, DELAYED RELEASE ORAL at 08:08

## 2022-08-08 RX ADMIN — AMLODIPINE BESYLATE 5 MG: 5 TABLET ORAL at 06:08

## 2022-08-08 NOTE — NURSING
Patient is pacing the hallways, banging his head against the wall multiple times, saying he needs Haldol immediately or he will continue to hurt himself.     Dr. Pike called at this time and explained the situation. He said to give him a once time dose of Haldol IM and explain to the patient that he can't continue with this behavior. Patient given Haldol at this time IM.

## 2022-08-08 NOTE — PSYCH EVALUATION
"8/8/2022 11:14 AM   Keshav Godfrey   1985   [id number]            Psychiatry Inpatient Admission Note    Date of Admission: 8/7/2022  4:14 AM    Current Legal Status: Physician's Emergency Certificate (PEC)    Chief Complaint: Suicidal ideations    SUBJECTIVE:   History of Present Illness:   Keshav Godfrey is a 37 y.o. male placed under a PEC at [facility] due to reported suicidal ideation.  He states that he was at [place] "a few weeks ago".   He states that he relapsed the day that he got back from [place].  Uses methamphetamine and heroin (intranasal and smoking, as well as eating).  He is homeless and states that he is interested in rehab.  He reports that Haldol helps with his anxiety and withdrawal.  He is also hypertensive and states that his home lisinopril had to be increased so will have medical see him today to evaluate.    UDS: (+)Opioids, amphetamines, Cannabinoids, Fentanyl    No opioids on UDS on 7/15/22      Past Psychiatric History:   Previous Psychiatric Hospitalizations: Roughly 20 inpatient hospitalizations   Previous Suicide Attempts: Attempted overdose several years ago   Outpatient psychiatrist: None    Past Medical/Surgical History:   Past Medical History:   Diagnosis Date    Bipolar 1 disorder      No past surgical history on file.      Family Psychiatric History:   Denies     Allergies:   Review of patient's allergies indicates:   Allergen Reactions    Buspirone     Codeine     Penicillins        Substance Abuse History:   Tobacco: 2ppd  Alcohol: "Not very often"  Illicit Substances: Methamphetamine and heroin  Treatment: Yes      Current Medications:   Home Psychiatric Meds: Depakote 500mg, Seroquel 300mg, Gabapentin 400mg, Vistaril 25mg, Buspar 5mg    Scheduled Meds:    cloNIDine  0.1 mg Oral TID    lisinopriL  20 mg Oral Daily    nicotine  1 patch Transdermal Daily    pantoprazole  40 mg Oral Daily      PRN Meds: acetaminophen, aluminum-magnesium hydroxide-simethicone, " dicyclomine, diphenhydrAMINE, haloperidol lactate, haloperidoL, hydrOXYzine HCL, loperamide, lorazepam, LORazepam, magnesium hydroxide 400 mg/5 ml, ondansetron, trazodone   Psychotherapeutics (From admission, onward)            Start     Stop Route Frequency Ordered    08/07/22 0442  haloperidol lactate injection 10 mg         -- IM Every 6 hours PRN 08/07/22 0442    08/07/22 0442  haloperidoL tablet 10 mg         -- Oral Every 6 hours PRN 08/07/22 0442    08/07/22 0442  lorazepam injection 2 mg         -- IM Every 6 hours PRN 08/07/22 0442    08/07/22 0442  LORazepam tablet 2 mg         -- Oral Every 6 hours PRN 08/07/22 0442    08/07/22 0442  traZODone tablet 100 mg         -- Oral Nightly PRN 08/07/22 0442            Social History:  Housing Status: Homeless  Relationship Status/Sexual Orientation: Never    Children: None  Education: 11th grade education   Employment Status/Info: Financially supported by selling drugs         OBJECTIVE:   Medical Review Of Systems:  Constitutional: rhinorrrhea, diaphoresis  Respiratory: negative  Cardiovascular: negative  Gastrointestinal: diarrhea (though staff states that he has not had   Genitourinary:negative  Musculoskeletal:negative  Neurological: negative    Vitals   Vitals:    08/07/22 0358   BP: (!) 152/100   Pulse: 70   Resp: 20   Temp: 97.7 °F (36.5 °C)        Labs/Imaging/Studies:   Recent Results (from the past 48 hour(s))   Urinalysis, Reflex to Urine Culture Urine, Clean Catch    Collection Time: 08/06/22  6:56 PM    Specimen: Urine   Result Value Ref Range    Color, UA Dark Yellow (A) Yellow, Colorless, Other, Clear    Appearance, UA Cloudy (A) Clear    Specific Gravity, UA 1.029 1.001 - 1.030    pH, UA 5.5 5.0, 5.5, 6.0, 6.5, 7.0, 7.5, 8.0, 8.5    Protein, UA 2+ (A) Negative, 300  mg/dL    Glucose, UA 1+ (A) Negative, Normal mg/dL    Ketones, UA Trace (A) Negative, +1, +2, +3, +4, +5, >=160, >=80 mg/dL    Blood, UA 1+ (A) Negative unit/L    Bilirubin, UA  1+ (A) Negative mg/dL    Urobilinogen, UA 1.0 0.2, 1.0, Normal mg/dL    Nitrites, UA Negative Negative    Leukocyte Esterase, UA Negative Negative, 75  unit/L   Drug Screen, Urine    Collection Time: 08/06/22  6:56 PM   Result Value Ref Range    Amphetamines, Urine Positive (A) Negative    Barbituates, Urine Negative Negative    Benzodiazepine, Urine Negative Negative    Cannabinoids, Urine Positive (A) Negative    Cocaine, Urine Negative Negative    Fentanyl, Urine Positive (A) Negative    MDMA, Urine Negative Negative    Opiates, Urine Positive (A) Negative    Phencyclidine, Urine Negative Negative    pH, Urine 5.5 3.0 - 11.0    Specific Gravity, Urine Auto 1.029 1.001 - 1.035   Urinalysis, Microscopic    Collection Time: 08/06/22  6:56 PM   Result Value Ref Range    RBC, UA <5 <=5 /HPF    WBC, UA 43 (H) <=5 /HPF    Squamous Epithelial Cells, UA >36 (H) <=5 /HPF    Bacteria, UA None Seen None Seen, Rare, Occasional /HPF   Urine culture    Collection Time: 08/06/22  6:56 PM    Specimen: Urine   Result Value Ref Range    Urine Culture No Growth    Comprehensive metabolic panel    Collection Time: 08/06/22  7:04 PM   Result Value Ref Range    Sodium Level 138 136 - 145 mmol/L    Potassium Level 3.2 (L) 3.5 - 5.1 mmol/L    Chloride 105 98 - 107 mmol/L    Carbon Dioxide 20 (L) 22 - 29 mmol/L    Glucose Level 104 (H) 74 - 100 mg/dL    Blood Urea Nitrogen 15.3 8.9 - 20.6 mg/dL    Creatinine 1.69 (H) 0.73 - 1.18 mg/dL    Calcium Level Total 9.3 8.4 - 10.2 mg/dL    Protein Total 7.5 6.4 - 8.3 gm/dL    Albumin Level 4.5 3.5 - 5.0 gm/dL    Globulin 3.0 2.4 - 3.5 gm/dL    Albumin/Globulin Ratio 1.5 1.1 - 2.0 ratio    Bilirubin Total 1.0 <=1.5 mg/dL    Alkaline Phosphatase 90 40 - 150 unit/L    Alanine Aminotransferase 61 (H) 0 - 55 unit/L    Aspartate Aminotransferase 29 5 - 34 unit/L    Estimated GFR-Non  48 mls/min/1.73/m2   TSH    Collection Time: 08/06/22  7:04 PM   Result Value Ref Range    Thyroid  Stimulating Hormone 1.7065 0.3500 - 4.9400 uIU/mL   Ethanol    Collection Time: 08/06/22  7:04 PM   Result Value Ref Range    Ethanol Level <10.0 <=10.0 mg/dL   Acetaminophen level    Collection Time: 08/06/22  7:04 PM   Result Value Ref Range    Acetaminophen Level <17.4 (L) 17.4 - 30.0 ug/ml   Salicylate level    Collection Time: 08/06/22  7:04 PM   Result Value Ref Range    Salicylate Level <5.0 mg/dL   CBC with Differential    Collection Time: 08/06/22  7:04 PM   Result Value Ref Range    WBC 9.6 4.5 - 11.5 x10(3)/mcL    RBC 4.78 4.70 - 6.10 x10(6)/mcL    Hgb 14.0 14.0 - 18.0 gm/dL    Hct 39.9 (L) 42.0 - 52.0 %    MCV 83.5 80.0 - 94.0 fL    MCH 29.3 27.0 - 31.0 pg    MCHC 35.1 33.0 - 36.0 mg/dL    RDW 12.8 11.5 - 17.0 %    Platelet 306 130 - 400 x10(3)/mcL    MPV 9.2 7.4 - 10.4 fL    Neut % 42.9 %    Lymph % 43.3 %    Mono % 11.6 %    Eos % 1.0 %    Basophil % 0.7 %    Lymph # 4.14 0.6 - 4.6 x10(3)/mcL    Neut # 4.1 2.1 - 9.2 x10(3)/mcL    Mono # 1.11 0.1 - 1.3 x10(3)/mcL    Eos # 0.10 0 - 0.9 x10(3)/mcL    Baso # 0.07 0 - 0.2 x10(3)/mcL    IG# 0.05 (H) 0 - 0.04 x10(3)/mcL    IG% 0.5 %    NRBC% 0.0 %   COVID-19 Rapid Screening    Collection Time: 08/06/22 10:29 PM   Result Value Ref Range    SARS COV-2 MOLECULAR Negative Negative   Comprehensive metabolic panel    Collection Time: 08/08/22  7:55 AM   Result Value Ref Range    Sodium Level 140 136 - 145 mmol/L    Potassium Level 4.1 3.5 - 5.1 mmol/L    Chloride 103 98 - 107 mmol/L    Carbon Dioxide 23 22 - 29 mmol/L    Glucose Level 90 74 - 100 mg/dL    Blood Urea Nitrogen 20.4 8.9 - 20.6 mg/dL    Creatinine 1.02 0.73 - 1.18 mg/dL    Calcium Level Total 9.6 8.4 - 10.2 mg/dL    Protein Total 7.2 6.4 - 8.3 gm/dL    Albumin Level 4.3 3.5 - 5.0 gm/dL    Globulin 2.9 2.4 - 3.5 gm/dL    Albumin/Globulin Ratio 1.5 1.1 - 2.0 ratio    Bilirubin Total 1.5 <=1.5 mg/dL    Alkaline Phosphatase 89 40 - 150 unit/L    Alanine Aminotransferase 48 0 - 55 unit/L    Aspartate  "Aminotransferase 22 5 - 34 unit/L    eGFR >60 mls/min/1.73/m2   Lipid panel    Collection Time: 08/08/22  7:55 AM   Result Value Ref Range    Cholesterol Total 257 (H) <=200 mg/dL    HDL Cholesterol 25 (L) 35 - 60 mg/dL    Triglyceride 313 (H) 34 - 140 mg/dL    Cholesterol/HDL Ratio 10 (H) 0 - 5    Very Low Density Lipoprotein 63     LDL Cholesterol 169.00 (H) 50.00 - 140.00 mg/dL   TSH    Collection Time: 08/08/22  7:55 AM   Result Value Ref Range    Thyroid Stimulating Hormone 1.0280 0.3500 - 4.9400 uIU/mL   CBC with Differential    Collection Time: 08/08/22  7:55 AM   Result Value Ref Range    WBC 6.2 4.5 - 11.5 x10(3)/mcL    RBC 5.23 4.70 - 6.10 x10(6)/mcL    Hgb 15.7 14.0 - 18.0 gm/dL    Hct 44.1 42.0 - 52.0 %    MCV 84.3 80.0 - 94.0 fL    MCH 30.0 27.0 - 31.0 pg    MCHC 35.6 33.0 - 36.0 mg/dL    RDW 12.4 11.5 - 17.0 %    Platelet 283 130 - 400 x10(3)/mcL    MPV 9.6 7.4 - 10.4 fL    Neut % 38.7 %    Lymph % 47.2 %    Mono % 9.9 %    Eos % 2.7 %    Basophil % 1.0 %    Lymph # 2.92 0.6 - 4.6 x10(3)/mcL    Neut # 2.4 2.1 - 9.2 x10(3)/mcL    Mono # 0.61 0.1 - 1.3 x10(3)/mcL    Eos # 0.17 0 - 0.9 x10(3)/mcL    Baso # 0.06 0 - 0.2 x10(3)/mcL    IG# 0.03 0 - 0.04 x10(3)/mcL    IG% 0.5 %    NRBC% 0.0 %      No results found for: PHENYTOIN, PHENOBARB, VALPROATE, CBMZ        Psychiatric Mental Status Exam:  General Appearance: appears stated age, well-developed, well-nourished  Arousal: alert  Behavior: cooperative  Movements and Motor Activity: no abnormal involuntary movements noted  Orientation: oriented to person, place, time, and situation  Speech: normal rate, normal rhythm, normal volume, normal tone  Mood: "Depressed"  Affect: Restricted  Thought Process: linear  Associations: intact  Thought Content and Perceptions: recent suicidal ideation reported, no homicidal ideation, no auditory hallucinations, no visual hallucinations, no paranoid ideation, no ideas of reference, no evidence of delusions or " psychosis  Recent and Remote Memory: recent memory intact, remote memory intact  Attention and Concentration: intact, attentive to conversation  Fund of Knowledge: intact, aware of current events, vocabulary appropriate  Insight: intact  Judgment: questionable        Patient Strengths:  Access to care and Able to verbalize needs      Patient Liabilities:  Substance use and Depression      Discharge Criteria:  Improved mood and Improved coping skills    ASSESSMENT/PLAN:   Diagnosis:  Unspecified Depression (F32.9)  Opioid use disorder (F11.20)  Amphetamine use disorder (F15.10)  Cannabis use disorder (F12.10)    Past Medical History:   Diagnosis Date    Bipolar 1 disorder           Plan:  -Admit to [facility]  -Continue clonidine detox  -Medical evalaution  -Will attempt to obtain outside psychiatric records if available  -SW to assist with aftercare planning and collateral  -Once stable discharge home with outpatient follow up care and/or rehab  -Continue inpatient treatment under a PEC and/or [facility] for danger to self/ danger to others/grave disability as evidenced by danger to self      Estimated length of stay: 5-7 daus    Estimated Disposition: Substance treatment program    Estimated Follow-up: Substance treatment program      On this date, I have reviewed the medical history and Nursing Assessment, as well as records from referral source.  I have evaluated the mental status of the above named person and concur with the findings of all assessments.  I have provided medical direction for the development of the Treatment Plan.    I conclude that this patient meets admission criteria for inpatient treatment.  I certify that this patient poses a danger to self or others, or would otherwise be considered gravely disabled based on this assessment and/or provided collateral information.     I have provided medical direction for the development of the Treatment plan.  These services will be provided while this patient is under  my care and will be based on an individualized plan of care.  The patient can demonstrate a reasonable expectation of improvement in his/her disorder as a result of the active treatment being provided.      Gerald Pike M.D.

## 2022-08-08 NOTE — PLAN OF CARE
Problem: Adult Inpatient Plan of Care  Goal: Plan of Care Review  Outcome: Ongoing, Progressing  Goal: Patient-Specific Goal (Individualized)  Outcome: Ongoing, Progressing  Goal: Absence of Hospital-Acquired Illness or Injury  Outcome: Ongoing, Progressing  Goal: Optimal Comfort and Wellbeing  Outcome: Ongoing, Progressing  Goal: Readiness for Transition of Care  Outcome: Ongoing, Progressing     Problem: Activity and Energy Impairment (Depressive Signs/Symptoms)  Goal: Optimized Energy Level (Depressive Signs/Symptoms)  Outcome: Ongoing, Progressing     Problem: Cognitive Impairment (Depressive Signs/Symptoms)  Goal: Optimized Cognitive Function  Outcome: Ongoing, Progressing

## 2022-08-08 NOTE — CARE UPDATE
Pt accepted into [facility] for rehab. They state they can take pt on 8/12/2022 if he is discharged by then. They state that they will pick pt up.

## 2022-08-08 NOTE — CARE UPDATE
Rehab referral sent on pt's behalf to  [facility] for post DC plans.

## 2022-08-08 NOTE — H&P
Consult Note    Consults  SUBJECTIVE:     History of Present Illness:  Patient is a 37 y.o. male presents with elevated BP. Onset of symptoms was gradual starting 2 days ago with unchanged course since that time. Patient denies pain. Symptoms are aggravated by nothing. Symptoms improve with nothing.  Pt has H/O HTN and currently on Lisinopril 20 mg and Clonidine 0.1 mg po TID    Review of patient's allergies indicates:   Allergen Reactions    Buspirone     Codeine     Penicillins      Past Medical History:   Diagnosis Date    Bipolar 1 disorder      No past surgical history on file.  No family history on file.  Social History     Tobacco Use    Smoking status: Current Every Day Smoker     Types: Cigarettes   Substance Use Topics    Alcohol use: Not Currently    Drug use: Yes     Types: Methamphetamines     Comment: Methadone     Review of Systems   Constitutional: Negative.    HENT: Negative.    Respiratory: Negative.    Cardiovascular: Negative.    Gastrointestinal: Positive for heartburn.   Genitourinary: Negative.    Musculoskeletal: Positive for back pain.   Skin: Negative.    Neurological: Negative.    Endo/Heme/Allergies: Negative.        OBJECTIVE:     Vital Signs:  Temp:  [97.9 °F (36.6 °C)-98.1 °F (36.7 °C)]   Pulse:  [93]   Resp:  [18]   BP: (146-158)/()   SpO2:  [98 %-99 %]     Physical Exam  HENT:      Head: Normocephalic.   Cardiovascular:      Rate and Rhythm: Normal rate and regular rhythm.      Heart sounds: Normal heart sounds.   Pulmonary:      Effort: Pulmonary effort is normal.      Breath sounds: Normal breath sounds.   Abdominal:      General: Abdomen is flat. Bowel sounds are normal.      Palpations: Abdomen is soft.   Musculoskeletal:      Cervical back: Normal range of motion and neck supple.   Skin:     General: Skin is warm and dry.   Neurological:      Mental Status: He is alert.       Laboratory:  Recent Results (from the past 48 hour(s))   Urinalysis, Reflex to Urine  Culture Urine, Clean Catch    Collection Time: 08/06/22  6:56 PM    Specimen: Urine   Result Value Ref Range    Color, UA Dark Yellow (A) Yellow, Colorless, Other, Clear    Appearance, UA Cloudy (A) Clear    Specific Gravity, UA 1.029 1.001 - 1.030    pH, UA 5.5 5.0, 5.5, 6.0, 6.5, 7.0, 7.5, 8.0, 8.5    Protein, UA 2+ (A) Negative, 300  mg/dL    Glucose, UA 1+ (A) Negative, Normal mg/dL    Ketones, UA Trace (A) Negative, +1, +2, +3, +4, +5, >=160, >=80 mg/dL    Blood, UA 1+ (A) Negative unit/L    Bilirubin, UA 1+ (A) Negative mg/dL    Urobilinogen, UA 1.0 0.2, 1.0, Normal mg/dL    Nitrites, UA Negative Negative    Leukocyte Esterase, UA Negative Negative, 75  unit/L   Drug Screen, Urine    Collection Time: 08/06/22  6:56 PM   Result Value Ref Range    Amphetamines, Urine Positive (A) Negative    Barbituates, Urine Negative Negative    Benzodiazepine, Urine Negative Negative    Cannabinoids, Urine Positive (A) Negative    Cocaine, Urine Negative Negative    Fentanyl, Urine Positive (A) Negative    MDMA, Urine Negative Negative    Opiates, Urine Positive (A) Negative    Phencyclidine, Urine Negative Negative    pH, Urine 5.5 3.0 - 11.0    Specific Gravity, Urine Auto 1.029 1.001 - 1.035   Urinalysis, Microscopic    Collection Time: 08/06/22  6:56 PM   Result Value Ref Range    RBC, UA <5 <=5 /HPF    WBC, UA 43 (H) <=5 /HPF    Squamous Epithelial Cells, UA >36 (H) <=5 /HPF    Bacteria, UA None Seen None Seen, Rare, Occasional /HPF   Urine culture    Collection Time: 08/06/22  6:56 PM    Specimen: Urine   Result Value Ref Range    Urine Culture No Growth    Comprehensive metabolic panel    Collection Time: 08/06/22  7:04 PM   Result Value Ref Range    Sodium Level 138 136 - 145 mmol/L    Potassium Level 3.2 (L) 3.5 - 5.1 mmol/L    Chloride 105 98 - 107 mmol/L    Carbon Dioxide 20 (L) 22 - 29 mmol/L    Glucose Level 104 (H) 74 - 100 mg/dL    Blood Urea Nitrogen 15.3 8.9 - 20.6 mg/dL    Creatinine 1.69 (H) 0.73 - 1.18  mg/dL    Calcium Level Total 9.3 8.4 - 10.2 mg/dL    Protein Total 7.5 6.4 - 8.3 gm/dL    Albumin Level 4.5 3.5 - 5.0 gm/dL    Globulin 3.0 2.4 - 3.5 gm/dL    Albumin/Globulin Ratio 1.5 1.1 - 2.0 ratio    Bilirubin Total 1.0 <=1.5 mg/dL    Alkaline Phosphatase 90 40 - 150 unit/L    Alanine Aminotransferase 61 (H) 0 - 55 unit/L    Aspartate Aminotransferase 29 5 - 34 unit/L    Estimated GFR-Non  48 mls/min/1.73/m2   TSH    Collection Time: 08/06/22  7:04 PM   Result Value Ref Range    Thyroid Stimulating Hormone 1.7065 0.3500 - 4.9400 uIU/mL   Ethanol    Collection Time: 08/06/22  7:04 PM   Result Value Ref Range    Ethanol Level <10.0 <=10.0 mg/dL   Acetaminophen level    Collection Time: 08/06/22  7:04 PM   Result Value Ref Range    Acetaminophen Level <17.4 (L) 17.4 - 30.0 ug/ml   Salicylate level    Collection Time: 08/06/22  7:04 PM   Result Value Ref Range    Salicylate Level <5.0 mg/dL   CBC with Differential    Collection Time: 08/06/22  7:04 PM   Result Value Ref Range    WBC 9.6 4.5 - 11.5 x10(3)/mcL    RBC 4.78 4.70 - 6.10 x10(6)/mcL    Hgb 14.0 14.0 - 18.0 gm/dL    Hct 39.9 (L) 42.0 - 52.0 %    MCV 83.5 80.0 - 94.0 fL    MCH 29.3 27.0 - 31.0 pg    MCHC 35.1 33.0 - 36.0 mg/dL    RDW 12.8 11.5 - 17.0 %    Platelet 306 130 - 400 x10(3)/mcL    MPV 9.2 7.4 - 10.4 fL    Neut % 42.9 %    Lymph % 43.3 %    Mono % 11.6 %    Eos % 1.0 %    Basophil % 0.7 %    Lymph # 4.14 0.6 - 4.6 x10(3)/mcL    Neut # 4.1 2.1 - 9.2 x10(3)/mcL    Mono # 1.11 0.1 - 1.3 x10(3)/mcL    Eos # 0.10 0 - 0.9 x10(3)/mcL    Baso # 0.07 0 - 0.2 x10(3)/mcL    IG# 0.05 (H) 0 - 0.04 x10(3)/mcL    IG% 0.5 %    NRBC% 0.0 %   COVID-19 Rapid Screening    Collection Time: 08/06/22 10:29 PM   Result Value Ref Range    SARS COV-2 MOLECULAR Negative Negative   Comprehensive metabolic panel    Collection Time: 08/08/22  7:55 AM   Result Value Ref Range    Sodium Level 140 136 - 145 mmol/L    Potassium Level 4.1 3.5 - 5.1 mmol/L     Chloride 103 98 - 107 mmol/L    Carbon Dioxide 23 22 - 29 mmol/L    Glucose Level 90 74 - 100 mg/dL    Blood Urea Nitrogen 20.4 8.9 - 20.6 mg/dL    Creatinine 1.02 0.73 - 1.18 mg/dL    Calcium Level Total 9.6 8.4 - 10.2 mg/dL    Protein Total 7.2 6.4 - 8.3 gm/dL    Albumin Level 4.3 3.5 - 5.0 gm/dL    Globulin 2.9 2.4 - 3.5 gm/dL    Albumin/Globulin Ratio 1.5 1.1 - 2.0 ratio    Bilirubin Total 1.5 <=1.5 mg/dL    Alkaline Phosphatase 89 40 - 150 unit/L    Alanine Aminotransferase 48 0 - 55 unit/L    Aspartate Aminotransferase 22 5 - 34 unit/L    eGFR >60 mls/min/1.73/m2   Lipid panel    Collection Time: 08/08/22  7:55 AM   Result Value Ref Range    Cholesterol Total 257 (H) <=200 mg/dL    HDL Cholesterol 25 (L) 35 - 60 mg/dL    Triglyceride 313 (H) 34 - 140 mg/dL    Cholesterol/HDL Ratio 10 (H) 0 - 5    Very Low Density Lipoprotein 63     LDL Cholesterol 169.00 (H) 50.00 - 140.00 mg/dL   TSH    Collection Time: 08/08/22  7:55 AM   Result Value Ref Range    Thyroid Stimulating Hormone 1.0280 0.3500 - 4.9400 uIU/mL   CBC with Differential    Collection Time: 08/08/22  7:55 AM   Result Value Ref Range    WBC 6.2 4.5 - 11.5 x10(3)/mcL    RBC 5.23 4.70 - 6.10 x10(6)/mcL    Hgb 15.7 14.0 - 18.0 gm/dL    Hct 44.1 42.0 - 52.0 %    MCV 84.3 80.0 - 94.0 fL    MCH 30.0 27.0 - 31.0 pg    MCHC 35.6 33.0 - 36.0 mg/dL    RDW 12.4 11.5 - 17.0 %    Platelet 283 130 - 400 x10(3)/mcL    MPV 9.6 7.4 - 10.4 fL    Neut % 38.7 %    Lymph % 47.2 %    Mono % 9.9 %    Eos % 2.7 %    Basophil % 1.0 %    Lymph # 2.92 0.6 - 4.6 x10(3)/mcL    Neut # 2.4 2.1 - 9.2 x10(3)/mcL    Mono # 0.61 0.1 - 1.3 x10(3)/mcL    Eos # 0.17 0 - 0.9 x10(3)/mcL    Baso # 0.06 0 - 0.2 x10(3)/mcL    IG# 0.03 0 - 0.04 x10(3)/mcL    IG% 0.5 %    NRBC% 0.0 %   SYPHILIS ANTIBODY (WITH REFLEX RPR)    Collection Time: 08/08/22  7:55 AM   Result Value Ref Range    Syphilis Antibody Reactive (A) Nonreactive, Equivocal         Diagnostic Results:      Patient Active Problem  List   Diagnosis    Essential hypertension        ASSESSMENT/PLAN:   Add Norvasc 5 mg  Increase Lisinopril from 20 mg to 40 mg  Pt had a K+ level of 3.2 and latest of 4.1  Check BMP Thursday

## 2022-08-08 NOTE — NURSING
Spoke with Chanel Tim in regards to wrong date on patient PEC. Received a call from [facility] ER nurse, Haydee Lester and she states that Dr. Gerald Morrison who is the doctor currently working tonight, gives a verbal order to adjust the time and date on PEC to 8/6/22 @ 1901. After speaking with nursing Director Otto Story, I was instructed that the verbal order needs to coming from the doctor whom completed the original PEC. I called Haydee back at [facility] and informed her of the situation. Chanel tim aware and will follow up in the am.

## 2022-08-08 NOTE — PLAN OF CARE
Psychosocial Assessment     Pt is a 37 y.o. YO  male admitted due to polysubstance abuse and SI.   Pt UDS was Positive for THC, methamphetamines, fentanyl and opiates.  PT ETOH less than 10. Pt denies  SI, HI and AVH. Pt last inpatient  was last week at [facility]. . Pt presents Cooperative, with CM staff 1:1. CM met with pt at bedside due to him detoxing. Pt originally from [facility]  . Pt has  0 dependents. Pt  is Single .  Pt completed High school. Pt denies  service.  Pt reports financial issues. Pt unemployed.   Pt denies legal issues. Pt states they do  receive comfort from spiritual practices. Pt emergency contact is Sister. Their phone number is  [phone]  . Pt discharge plan at this time is rehab.       08/08/22 1000   Initial Information   Source of Information patient   Reason for Admission Depression   Mutuality/Individual Preferences   Anxieties, Fears or Concerns None   Patient-Specific Goals (Include Timeframe) neto will gain new coping skills to better help him refrain from substance abuse and SI within the next 7 days.   Relationship/Environment   Primary Source of Support/Comfort sibling(s)   Lives With alone   Resource/Environmental Concerns   Current Living Arrangements homeless   Substance Use/Withdrawal   Substance Use Current, used prior to admission  (UDS pos for mulitple substances)   Additional Tobacco Use   How many cigarettes do you typically have per day? 6   Abuse Screen (yes response referral indicated)   Feels Unsafe at Home or Work/School no   Feels Threatened by Someone no   Does Anyone Try to Keep You From Having Contact with Others or Doing Things Outside Your Home? no   Abuse Details   Physical Abuse No   Sexual Abuse No   Emotional Abuse No   Suicidal Ideation   1. Wish to be Dead (Lifetime) No   1. Wish to be Dead (Past Month) No   1. Wish to be Dead (Now) No   2. Non-Specific Active Suicidal Thoughts (Lifetime) No   2. Non-Specific Active Suicidal Thoughts  (Past Month) No   2. Non-Specific Active Suicidal Thoughts (Now) No   3. Active Suicidal Ideation with any Methods (Not Plan) Without Intent to Act (Lifetime) No   3. Active Sucidal Ideation with any Methods (Not Plan) Without Intent to Act (Past Month) No   3. Active Sucidal Ideation with any Methods (Not Plan) Without Intent to Act (Now) No   4. Active Suicidal Ideation with Some Intent to Act, Without Specific Plan (Lifetime) No   4. Active Suicidal Ideation with Some Intent to Act, Without Specific Plan (Past Month) No   4. Active Suicidal Ideation with Some Intent to Act, Without Specific Plan (Now) No   5. Active Suicidal Ideation with Specific Plan and Intent (Lifetime) No   5. Active Suicidal Ideation with Specific Plan and Intent (Past Month) No   5. Active Suicidal Ideation with Specific Plan and Intent (Now) No   Suicide Risk No Risk   Violence Risk   Feels Like Hurting Others no   Previous Attempt to Harm Others no   AUDIT-C (Alcohol Use Disorders ID Test)   Alcohol Use In Past Year 1-->monthly or less   Alcohol Amount Per Day In Past Year 1-->three or four   More Than 6 Drinks On One Occasion 1-->less than monthly   Total AUDIT-C Score 3   Depression Screen (Over the Past Two Weeks)   Have You Felt Down, Depressed or Hopeless? no   Have You Felt Little Interest or Pleasure in Doing Things? no   Transition Planning   Patient/Family Anticipates Transition to inpatient rehabilitation facility   Patient/Family Anticipated Services at Transition rehabilitation services   Transportation Anticipated health plan transportation

## 2022-08-09 LAB
RPR SER QL: NORMAL
RPR SER QL: NORMAL
RPR SER-TITR: NORMAL {TITER}

## 2022-08-09 PROCEDURE — 25000003 PHARM REV CODE 250: Performed by: PSYCHIATRY & NEUROLOGY

## 2022-08-09 PROCEDURE — 25000003 PHARM REV CODE 250: Performed by: PEDIATRICS

## 2022-08-09 PROCEDURE — 11400000 HC PSYCH PRIVATE ROOM

## 2022-08-09 PROCEDURE — S4991 NICOTINE PATCH NONLEGEND: HCPCS | Performed by: PSYCHIATRY & NEUROLOGY

## 2022-08-09 PROCEDURE — 25000003 PHARM REV CODE 250: Performed by: FAMILY MEDICINE

## 2022-08-09 RX ADMIN — HYDROXYZINE HYDROCHLORIDE 25 MG: 25 TABLET ORAL at 02:08

## 2022-08-09 RX ADMIN — CLONIDINE HYDROCHLORIDE 0.1 MG: 0.1 TABLET ORAL at 12:08

## 2022-08-09 RX ADMIN — PANTOPRAZOLE SODIUM 40 MG: 40 TABLET, DELAYED RELEASE ORAL at 09:08

## 2022-08-09 RX ADMIN — HYDROXYZINE HYDROCHLORIDE 25 MG: 25 TABLET ORAL at 08:08

## 2022-08-09 RX ADMIN — CLONIDINE HYDROCHLORIDE 0.1 MG: 0.1 TABLET ORAL at 08:08

## 2022-08-09 RX ADMIN — QUETIAPINE FUMARATE 300 MG: 300 TABLET ORAL at 08:08

## 2022-08-09 RX ADMIN — ONDANSETRON 8 MG: 4 TABLET, ORALLY DISINTEGRATING ORAL at 03:08

## 2022-08-09 RX ADMIN — ACETAMINOPHEN 325MG 650 MG: 325 TABLET ORAL at 03:08

## 2022-08-09 RX ADMIN — HYDROXYZINE HYDROCHLORIDE 25 MG: 25 TABLET ORAL at 09:08

## 2022-08-09 RX ADMIN — NICOTINE 1 PATCH: 21 PATCH, EXTENDED RELEASE TRANSDERMAL at 09:08

## 2022-08-09 RX ADMIN — DIVALPROEX SODIUM 250 MG: 250 TABLET, DELAYED RELEASE ORAL at 09:08

## 2022-08-09 RX ADMIN — AMLODIPINE BESYLATE 5 MG: 5 TABLET ORAL at 09:08

## 2022-08-09 RX ADMIN — DIVALPROEX SODIUM 250 MG: 250 TABLET, DELAYED RELEASE ORAL at 08:08

## 2022-08-09 RX ADMIN — TRAZODONE HYDROCHLORIDE 100 MG: 100 TABLET ORAL at 08:08

## 2022-08-09 RX ADMIN — LISINOPRIL 40 MG: 10 TABLET ORAL at 09:08

## 2022-08-09 NOTE — PROGRESS NOTES
08/09/22 1400   [facility] Group Therapy   Group Name Therapeutic Recreation   Specific Interventions Skilled Activity Creative Expression   Participation Level None   Participation Quality Refused;Sleeping   Insight/Motivation None   Affect/Mood Display Unable to Assess   Cognition Unable to Assess   Psychomotor Unable to Assess

## 2022-08-09 NOTE — PROGRESS NOTES
"8/9/2022 4:33 PM   Keshav Godfrey   1985   [id number]        Psychiatry Progress Note     SUBJECTIVE:   Keshav Godfrey is a 37 y.o. male placed under a PEC at [facility] due to reported suicidal ideation.  He states that he was at [facility] "a few weeks ago".  He states that he relapsed the day that he got back from [facility].  Patient accepted into [facility] for 8/12/22. Patient reports that his mood today is "great". Denies side effects from the medication regimen; no evidence of EPS, TD, or dystonia. Nursing reports that he has been irritable, however, although he agrees with this there is no overt physical aggression. Patient denies suicidal ideations today. Blood pressure has improved. Will continue current treatment plan and monitor for safety and behavior.       Current Medications:   Scheduled Meds:    amLODIPine  5 mg Oral Daily    cloNIDine  0.1 mg Oral TID    divalproex  250 mg Oral BID    hydrOXYzine HCL  25 mg Oral TID    lisinopriL  40 mg Oral Daily    nicotine  1 patch Transdermal Daily    pantoprazole  40 mg Oral Daily    QUEtiapine  300 mg Oral QHS      PRN Meds: acetaminophen, aluminum-magnesium hydroxide-simethicone, dicyclomine, diphenhydrAMINE, haloperidol lactate, haloperidoL, hydrOXYzine HCL, loperamide, lorazepam, LORazepam, magnesium hydroxide 400 mg/5 ml, ondansetron, trazodone   Psychotherapeutics (From admission, onward)            Start     Stop Route Frequency Ordered    08/08/22 2100  QUEtiapine tablet 300 mg         -- Oral Nightly 08/08/22 1135    08/07/22 0442  haloperidol lactate injection 10 mg         -- IM Every 6 hours PRN 08/07/22 0442    08/07/22 0442  haloperidoL tablet 10 mg         -- Oral Every 6 hours PRN 08/07/22 0442    08/07/22 0442  lorazepam injection 2 mg         -- IM Every 6 hours PRN 08/07/22 0442    08/07/22 0442  LORazepam tablet 2 mg         -- Oral Every 6 hours PRN 08/07/22 0442    08/07/22 0442  traZODone tablet 100 mg         -- Oral " Nightly PRN 08/07/22 0442          Allergies:   Review of patient's allergies indicates:   Allergen Reactions    Buspirone     Codeine     Penicillins         OBJECTIVE:   Vitals   Vitals:    08/09/22 1100   BP: (!) 137/104   Pulse: 97   Resp: 18   Temp: 98.4 °F (36.9 °C)        Labs/Imaging/Studies:   Recent Results (from the past 36 hour(s))   Comprehensive metabolic panel    Collection Time: 08/08/22  7:55 AM   Result Value Ref Range    Sodium Level 140 136 - 145 mmol/L    Potassium Level 4.1 3.5 - 5.1 mmol/L    Chloride 103 98 - 107 mmol/L    Carbon Dioxide 23 22 - 29 mmol/L    Glucose Level 90 74 - 100 mg/dL    Blood Urea Nitrogen 20.4 8.9 - 20.6 mg/dL    Creatinine 1.02 0.73 - 1.18 mg/dL    Calcium Level Total 9.6 8.4 - 10.2 mg/dL    Protein Total 7.2 6.4 - 8.3 gm/dL    Albumin Level 4.3 3.5 - 5.0 gm/dL    Globulin 2.9 2.4 - 3.5 gm/dL    Albumin/Globulin Ratio 1.5 1.1 - 2.0 ratio    Bilirubin Total 1.5 <=1.5 mg/dL    Alkaline Phosphatase 89 40 - 150 unit/L    Alanine Aminotransferase 48 0 - 55 unit/L    Aspartate Aminotransferase 22 5 - 34 unit/L    eGFR >60 mls/min/1.73/m2   Lipid panel    Collection Time: 08/08/22  7:55 AM   Result Value Ref Range    Cholesterol Total 257 (H) <=200 mg/dL    HDL Cholesterol 25 (L) 35 - 60 mg/dL    Triglyceride 313 (H) 34 - 140 mg/dL    Cholesterol/HDL Ratio 10 (H) 0 - 5    Very Low Density Lipoprotein 63     LDL Cholesterol 169.00 (H) 50.00 - 140.00 mg/dL   TSH    Collection Time: 08/08/22  7:55 AM   Result Value Ref Range    Thyroid Stimulating Hormone 1.0280 0.3500 - 4.9400 uIU/mL   CBC with Differential    Collection Time: 08/08/22  7:55 AM   Result Value Ref Range    WBC 6.2 4.5 - 11.5 x10(3)/mcL    RBC 5.23 4.70 - 6.10 x10(6)/mcL    Hgb 15.7 14.0 - 18.0 gm/dL    Hct 44.1 42.0 - 52.0 %    MCV 84.3 80.0 - 94.0 fL    MCH 30.0 27.0 - 31.0 pg    MCHC 35.6 33.0 - 36.0 mg/dL    RDW 12.4 11.5 - 17.0 %    Platelet 283 130 - 400 x10(3)/mcL    MPV 9.6 7.4 - 10.4 fL    Neut %  "38.7 %    Lymph % 47.2 %    Mono % 9.9 %    Eos % 2.7 %    Basophil % 1.0 %    Lymph # 2.92 0.6 - 4.6 x10(3)/mcL    Neut # 2.4 2.1 - 9.2 x10(3)/mcL    Mono # 0.61 0.1 - 1.3 x10(3)/mcL    Eos # 0.17 0 - 0.9 x10(3)/mcL    Baso # 0.06 0 - 0.2 x10(3)/mcL    IG# 0.03 0 - 0.04 x10(3)/mcL    IG% 0.5 %    NRBC% 0.0 %   SYPHILIS ANTIBODY (WITH REFLEX RPR)    Collection Time: 08/08/22  7:55 AM   Result Value Ref Range    Syphilis Antibody Reactive (A) Nonreactive, Equivocal   RPR    Collection Time: 08/08/22  7:55 AM   Result Value Ref Range    RPR Non-Reactive Non-Reactive    RPR Titer      RPR #          Psychiatric Mental Status Exam:  General Appearance: appears stated age, well-developed, well-nourished  Arousal: alert  Behavior: cooperative  Movements and Motor Activity: no abnormal involuntary movements noted  Orientation: oriented to person, place, time, and situation  Speech: normal rate, normal rhythm, normal volume, normal tone  Mood: "Great"  Affect: Restricted  Thought Process: linear  Associations: intact  Thought Content and Perceptions: recent suicidal ideation reported, no homicidal ideation, no auditory hallucinations, no visual hallucinations, no paranoid ideation, no ideas of reference, no evidence of delusions or psychosis  Recent and Remote Memory: recent memory intact, remote memory intact  Attention and Concentration: intact, attentive to conversation  Fund of Knowledge: intact, aware of current events, vocabulary appropriate  Insight: intact  Judgment: questionable    ASSESSMENT/PLAN:   Diagnosis:  Unspecified Depression (F32.9)  Opioid use disorder (F11.20)  Amphetamine use disorder (F15.10)  Cannabis use disorder (F12.10)    Past Medical History:   Diagnosis Date    Bipolar 1 disorder         Plan:  - Continue Clonidine detox  - Follow-up with aftercare planning    Estimated Disposition: Substance treatment program      Marcellus Westfall [id number]  8/9/2022  "

## 2022-08-09 NOTE — PROGRESS NOTES
08/09/22 0900   General   Admit Date 08/07/22   Primary Diagnosis mood d/o   Secondary Diagnosis polysubstance abuse   Number of Children 0   Occupation drug dealer   Subjective   Patient states rehab   Leisure Interest Survey   Leisure Interest Survey No   Goals   Additional Documentation yes   Goal Formulation With patient   Time For Goal Achievement 7 days   Goal 1 attend at least 3 TR groups prior to discharge   Plan   Planned Therapy Intervention Group Recreational Therapy   Expected Length of Stay 5-7days   PT Frequency Minimum of 3 visits per week   Keshav is 37 male admitted for mood d/o & polysubstance abuse with a uds + amp, cannabis, fentanyl & opiates. Pt refused assessment, CTRS performed evaluation utilizing EMR records and observations.

## 2022-08-09 NOTE — GROUP NOTE
Group Psychotherapy       Group Focus: Psychodynamic Group Psychotherapy     Group topic: Coping Skills: Therapist explored patients need for increase in effective coping skills to deal with stress and/or anxiety. Patient was able to discuss ways to learn new coping skills and implement them in the future. Patient continues to make progress towards treatment goals.      Number of patients in attendance: 16    Group Start Time: 1045  Group End Time:  1130  Groups Date: 8/9/2022  Group Topic:  Behavioral Health  Group Department: [facility] Behavioral Health Unit  Group Facilitators:  Dexter Perry LCSW  _____________________________________________________________________    Patient Name: Keshav Godfrey  MRN: [id number]  Patient Class: IP- Psych   Admission Date\Time: 8/7/2022  4:14 AM  Hospital Length of Stay: 2  Primary Care Provider: Yifan Rodarte MD     Referred by: Acute Psychiatry Unit Treatment Team     Target symptoms: Substance Abuse and Depression     Patient's response to treatment: Not Participating     Progress toward goals: Progressing slowly       Plan: Continue treatment on APU

## 2022-08-09 NOTE — PLAN OF CARE
08/08/22 2130   Pain/Comfort/Sleep   Comfort/Acceptable Pain Level 0   Pain Rating (0-10): Rest 0   Coping/Psychosocial   Verbalized Emotional State anger;anxiety  (pt upset due to roommate snoring)   Trust Relationship/Rapport care explained   Behavioral   General Appearance WDL WDL   Behavior WDL   Behavior WDL ex   Emotion Mood WDL   Emotion/Mood/Affect WDL ex   Affect labile   Emotion/Mood irritable   Speech WDL   Speech WDL WDL   Perceptual State WDL   Perceptual State WDL ex   Hallucinations denies hallucinations   Thought Process WDL   Thought Process WDL ex   Delusions paranoid   Judgment and Insight insight not appropriate to situation   Thought Content somatic concerns   Thought Process circumstantial thought   Intellectual Performance WDL   Intellectual Performance WDL WDL   AIMS   Face/Oral Movement: Face Muscle Express 0-->none   Face/Oral Movement: Lips/Perioral 0-->none   Face/Oral Movement: Jaw 0-->none   Face/Oral Movement: Tongue 0-->none   Extremity Movement: Upper 0-->none   Extremity Movement: Lower 0-->none   Trunk Movement: Neck/Shoulder/Hips 0-->none   Global Judgment: Severity 0-->none, normal   Global Judgment: Incapacitated 0-->none, normal   Global Judgment: Awareness 0-->no awareness   Dental: Current Problems no   Dental: Dentures Worn no   Additional Info: Edentia no   Additional Info: Disappear with Sleep no   Cognitive   Cognitive/Neuro/Behavioral WDL WDL;ex   Mood/Behavior anxious   COWS (Clinical Opiate Withdrawal Scale)   Resting Pulse Rate 1-->pulse rate    Sweating 1-->subjective report of chills or flushing   Restlessness 1-->reports difficulty sitting still, but is able to do so   Pupil Size 0-->pupils pinned or normal size for room light   Bone or Joint Aches 1-->mild/diffuse discomfort   Runny Nose or Tearing 0-->none present   GI Upset 0-->no GI symptoms   Tremor 1-->tremor can be felt, but not observed   Yawning 0-->no yawning   Anxiety or Irritability 2-->patient  obviously irritable or anxious   Gooseflesh Skin 0-->skin is smooth   Score 7   ABC Risk for Fall with Injury Assessment   A= Age: Is the patient greater than or equal to 85 years old or frail due to clinical condition? No   B=Bones: Does the patient have osteoporosis, previous fracture, prolonged steroid use, or metastatic bone cancer? No   C=Coagulation Disorders: Does the patient have a bleeding disorder, either through anticoagulants or underlying clinical condition? No   S=recent Surgery: Is the patient post-op surgicalwith a recent lower limb amputation or recent major abdominal or thoracic surgery? No   Safety Management    Safety Promotion/Fall Prevention assistive device/personal item within reach;nonskid shoes/socks when out of bed   Gastrointestinal   GI WDL WDL   Last Bowel Movement 08/06/22   Genitourinary   Genitourinary WDL WDL   Skin   Skin WDL WDL   Roderick Risk Assessment   Sensory Perception 4-->no impairment   Moisture 4-->rarely moist   Activity 4-->walks frequently   Mobility 4-->no limitation   Nutrition 4-->excellent   Friction and Shear 3-->no apparent problem   Roderick Score 23

## 2022-08-09 NOTE — PLAN OF CARE
08/08/22 2130   COWS (Clinical Opiate Withdrawal Scale)   Resting Pulse Rate 1-->pulse rate    Sweating 1-->subjective report of chills or flushing   Restlessness 1-->reports difficulty sitting still, but is able to do so   Pupil Size 0-->pupils pinned or normal size for room light   Bone or Joint Aches 1-->mild/diffuse discomfort   Runny Nose or Tearing 0-->none present   GI Upset 0-->no GI symptoms   Tremor 1-->tremor can be felt, but not observed   Yawning 0-->no yawning   Anxiety or Irritability 2-->patient obviously irritable or anxious   Gooseflesh Skin 0-->skin is smooth   Score 7

## 2022-08-09 NOTE — PROGRESS NOTES
08/09/22 1000   [facility] Group Therapy   Group Name Therapeutic Recreation   Specific Interventions Skilled Activity Mild Exercises   Participation Level None   Participation Quality Sleeping   Insight/Motivation Other (see comments)  (excused)   Affect/Mood Display Unable to Assess   Cognition Unable to Assess   Psychomotor Unable to Assess

## 2022-08-10 PROCEDURE — 25000003 PHARM REV CODE 250: Performed by: PEDIATRICS

## 2022-08-10 PROCEDURE — 25000003 PHARM REV CODE 250: Performed by: FAMILY MEDICINE

## 2022-08-10 PROCEDURE — 25000003 PHARM REV CODE 250: Performed by: PSYCHIATRY & NEUROLOGY

## 2022-08-10 PROCEDURE — 11400000 HC PSYCH PRIVATE ROOM

## 2022-08-10 RX ADMIN — ACETAMINOPHEN 325MG 650 MG: 325 TABLET ORAL at 10:08

## 2022-08-10 RX ADMIN — HYDROXYZINE HYDROCHLORIDE 25 MG: 25 TABLET ORAL at 08:08

## 2022-08-10 RX ADMIN — AMLODIPINE BESYLATE 5 MG: 5 TABLET ORAL at 09:08

## 2022-08-10 RX ADMIN — QUETIAPINE FUMARATE 300 MG: 300 TABLET ORAL at 08:08

## 2022-08-10 RX ADMIN — DIVALPROEX SODIUM 250 MG: 250 TABLET, DELAYED RELEASE ORAL at 08:08

## 2022-08-10 RX ADMIN — DIVALPROEX SODIUM 250 MG: 250 TABLET, DELAYED RELEASE ORAL at 09:08

## 2022-08-10 RX ADMIN — HYDROXYZINE HYDROCHLORIDE 25 MG: 25 TABLET ORAL at 03:08

## 2022-08-10 RX ADMIN — PANTOPRAZOLE SODIUM 40 MG: 40 TABLET, DELAYED RELEASE ORAL at 09:08

## 2022-08-10 RX ADMIN — TRAZODONE HYDROCHLORIDE 100 MG: 100 TABLET ORAL at 08:08

## 2022-08-10 RX ADMIN — LISINOPRIL 40 MG: 10 TABLET ORAL at 09:08

## 2022-08-10 RX ADMIN — ACETAMINOPHEN 325MG 650 MG: 325 TABLET ORAL at 03:08

## 2022-08-10 RX ADMIN — HYDROXYZINE HYDROCHLORIDE 25 MG: 25 TABLET ORAL at 09:08

## 2022-08-10 NOTE — CARE UPDATE
Treatment Team    Pt seem for treatment team today with interdisciplinary team.  Pt is Cooperative with Tx team. Pt denies symptoms at this time. MD did not change pt meds at this time. Treatment teams goals Not met at this time. Pt DC plan is rehab. DC date scheduled for Friday 8/12/22.

## 2022-08-10 NOTE — PLAN OF CARE
Keshav refuses to attend TR groups, does not interact with peers, demanding & entitled with staff, and attend his ADL's.

## 2022-08-10 NOTE — PROGRESS NOTES
8/10/2022 4:33 PM   Keshav Godfrey   1985   [id number]        Psychiatry Progress Note     SUBJECTIVE:   Keshav Godfrey is a 37 y.o. male placed under a PEC at [facility] due to reported suicidal ideation.  Staff spoke with patient's sister who states that he often overtakes his medication (ex: Seroquel) at home in order be sedated.  This would explain why he has been demanding Haldol and acting out in order to get it.  He is asking about discharge.  Staff working on aftercare with [facility].  Not attending groups because he sleeps during the day.  Poor sleep at night but likely because he is sleeping during the day.  Has been irritable and acting out at times.       Current Medications:   Scheduled Meds:    amLODIPine  5 mg Oral Daily    divalproex  250 mg Oral BID    hydrOXYzine HCL  25 mg Oral TID    lisinopriL  40 mg Oral Daily    nicotine  1 patch Transdermal Daily    pantoprazole  40 mg Oral Daily    QUEtiapine  300 mg Oral QHS      PRN Meds: acetaminophen, aluminum-magnesium hydroxide-simethicone, diphenhydrAMINE, haloperidol lactate, haloperidoL, hydrOXYzine HCL, loperamide, lorazepam, LORazepam, magnesium hydroxide 400 mg/5 ml, ondansetron, trazodone   Psychotherapeutics (From admission, onward)            Start     Stop Route Frequency Ordered    08/08/22 2100  QUEtiapine tablet 300 mg         -- Oral Nightly 08/08/22 1135    08/07/22 0442  haloperidol lactate injection 10 mg         -- IM Every 6 hours PRN 08/07/22 0442    08/07/22 0442  haloperidoL tablet 10 mg         -- Oral Every 6 hours PRN 08/07/22 0442    08/07/22 0442  lorazepam injection 2 mg         -- IM Every 6 hours PRN 08/07/22 0442    08/07/22 0442  LORazepam tablet 2 mg         -- Oral Every 6 hours PRN 08/07/22 0442    08/07/22 0442  traZODone tablet 100 mg         -- Oral Nightly PRN 08/07/22 0442          Allergies:   Review of patient's allergies indicates:   Allergen Reactions    Buspirone     Codeine      "Penicillins         OBJECTIVE:   Vitals   Vitals:    08/09/22 2000   BP: 138/67   Pulse: 87   Resp: 18   Temp: 98.1 °F (36.7 °C)        Labs/Imaging/Studies:   No results found for this or any previous visit (from the past 36 hour(s)).     Psychiatric Mental Status Exam:  General Appearance: appears stated age, well-developed, well-nourished  Arousal: alert  Behavior: cooperative  Movements and Motor Activity: no abnormal involuntary movements noted  Orientation: oriented to person, place, time, and situation  Speech: normal rate, normal rhythm, normal volume, normal tone  Mood: "Fine"  Affect: Restricted  Thought Process: linear  Associations: intact  Thought Content and Perceptions: recent suicidal ideation reported, no homicidal ideation, no auditory hallucinations, no visual hallucinations, no paranoid ideation, no ideas of reference, no evidence of delusions or psychosis  Recent and Remote Memory: recent memory intact, remote memory intact  Attention and Concentration: intact, attentive to conversation  Fund of Knowledge: intact, aware of current events, vocabulary appropriate  Insight: intact  Judgment: questionable    ASSESSMENT/PLAN:   Diagnosis:  Unspecified Depression (F32.9)  Opioid use disorder (F11.20)  Amphetamine use disorder (F15.10)  Cannabis use disorder (F12.10)    Past Medical History:   Diagnosis Date    Bipolar 1 disorder         Plan:  -F/u with Rehab    Estimated Disposition: Substance treatment program      Gerald Pike M.D.  8/10/2022  "

## 2022-08-10 NOTE — NURSING
Pt was seen by Yannick for Treatment team. He is to Be discharged on Friday to [facility].

## 2022-08-11 PROBLEM — F11.20 OPIOID DEPENDENCE: Status: ACTIVE | Noted: 2022-08-11

## 2022-08-11 PROBLEM — F32.9 MAJOR DEPRESSIVE DISORDER, SINGLE EPISODE, UNSPECIFIED: Status: ACTIVE | Noted: 2022-08-11

## 2022-08-11 PROBLEM — F12.10 CANNABIS ABUSE: Status: ACTIVE | Noted: 2022-08-11

## 2022-08-11 PROBLEM — F15.10 METHAMPHETAMINE USE: Status: ACTIVE | Noted: 2022-08-11

## 2022-08-11 LAB — T PALLIDUM AB SER QL: REACTIVE

## 2022-08-11 PROCEDURE — 25000003 PHARM REV CODE 250: Performed by: PSYCHIATRY & NEUROLOGY

## 2022-08-11 PROCEDURE — 11400000 HC PSYCH PRIVATE ROOM

## 2022-08-11 PROCEDURE — S4991 NICOTINE PATCH NONLEGEND: HCPCS | Performed by: PSYCHIATRY & NEUROLOGY

## 2022-08-11 PROCEDURE — 25000003 PHARM REV CODE 250: Performed by: PEDIATRICS

## 2022-08-11 PROCEDURE — 25000003 PHARM REV CODE 250: Performed by: FAMILY MEDICINE

## 2022-08-11 RX ORDER — AMLODIPINE BESYLATE 5 MG/1
5 TABLET ORAL DAILY
Qty: 30 TABLET | Refills: 0 | Status: ON HOLD | OUTPATIENT
Start: 2022-08-12 | End: 2023-08-15

## 2022-08-11 RX ORDER — QUETIAPINE FUMARATE 25 MG/1
50 TABLET, FILM COATED ORAL DAILY
Status: DISCONTINUED | OUTPATIENT
Start: 2022-08-11 | End: 2022-08-12 | Stop reason: HOSPADM

## 2022-08-11 RX ORDER — LISINOPRIL 40 MG/1
40 TABLET ORAL DAILY
Qty: 90 TABLET | Refills: 0 | Status: ON HOLD | OUTPATIENT
Start: 2022-08-12 | End: 2023-08-15 | Stop reason: HOSPADM

## 2022-08-11 RX ORDER — DIVALPROEX SODIUM 250 MG/1
250 TABLET, DELAYED RELEASE ORAL 2 TIMES DAILY
Qty: 60 TABLET | Refills: 0 | Status: ON HOLD | OUTPATIENT
Start: 2022-08-11 | End: 2023-08-15 | Stop reason: HOSPADM

## 2022-08-11 RX ORDER — QUETIAPINE FUMARATE 50 MG/1
50 TABLET, FILM COATED ORAL DAILY
Qty: 30 TABLET | Refills: 0 | Status: ON HOLD | OUTPATIENT
Start: 2022-08-12 | End: 2023-08-15 | Stop reason: HOSPADM

## 2022-08-11 RX ORDER — HYDROXYZINE HYDROCHLORIDE 25 MG/1
25 TABLET, FILM COATED ORAL 3 TIMES DAILY
Qty: 90 TABLET | Refills: 0 | Status: ON HOLD | OUTPATIENT
Start: 2022-08-11 | End: 2023-08-15 | Stop reason: HOSPADM

## 2022-08-11 RX ORDER — QUETIAPINE FUMARATE 300 MG/1
300 TABLET, FILM COATED ORAL NIGHTLY
Qty: 30 TABLET | Refills: 0 | Status: ON HOLD | OUTPATIENT
Start: 2022-08-11 | End: 2023-08-15 | Stop reason: HOSPADM

## 2022-08-11 RX ORDER — PANTOPRAZOLE SODIUM 40 MG/1
40 TABLET, DELAYED RELEASE ORAL DAILY
Qty: 30 TABLET | Refills: 0 | Status: ON HOLD | OUTPATIENT
Start: 2022-08-12 | End: 2023-08-15 | Stop reason: HOSPADM

## 2022-08-11 RX ADMIN — TRAZODONE HYDROCHLORIDE 100 MG: 100 TABLET ORAL at 10:08

## 2022-08-11 RX ADMIN — ACETAMINOPHEN 325MG 650 MG: 325 TABLET ORAL at 10:08

## 2022-08-11 RX ADMIN — HYDROXYZINE HYDROCHLORIDE 25 MG: 25 TABLET ORAL at 02:08

## 2022-08-11 RX ADMIN — ACETAMINOPHEN 325MG 650 MG: 325 TABLET ORAL at 08:08

## 2022-08-11 RX ADMIN — AMLODIPINE BESYLATE 5 MG: 5 TABLET ORAL at 09:08

## 2022-08-11 RX ADMIN — NICOTINE 1 PATCH: 21 PATCH, EXTENDED RELEASE TRANSDERMAL at 09:08

## 2022-08-11 RX ADMIN — QUETIAPINE 50 MG: 25 TABLET ORAL at 02:08

## 2022-08-11 RX ADMIN — HYDROXYZINE HYDROCHLORIDE 25 MG: 25 TABLET ORAL at 08:08

## 2022-08-11 RX ADMIN — LISINOPRIL 40 MG: 10 TABLET ORAL at 09:08

## 2022-08-11 RX ADMIN — QUETIAPINE FUMARATE 300 MG: 300 TABLET ORAL at 08:08

## 2022-08-11 RX ADMIN — DIVALPROEX SODIUM 250 MG: 250 TABLET, DELAYED RELEASE ORAL at 09:08

## 2022-08-11 RX ADMIN — HYDROXYZINE HYDROCHLORIDE 25 MG: 25 TABLET ORAL at 09:08

## 2022-08-11 RX ADMIN — DIVALPROEX SODIUM 250 MG: 250 TABLET, DELAYED RELEASE ORAL at 08:08

## 2022-08-11 RX ADMIN — PANTOPRAZOLE SODIUM 40 MG: 40 TABLET, DELAYED RELEASE ORAL at 09:08

## 2022-08-11 RX ADMIN — HYDROXYZINE HYDROCHLORIDE 50 MG: 50 TABLET, FILM COATED ORAL at 06:08

## 2022-08-11 NOTE — PROGRESS NOTES
08/10/22 1500   [facility] Group Therapy   Group Name Therapeutic Recreation   Specific Interventions Skilled Activity Creative Expression   Participation Level None   Participation Quality Refused;Sleeping   Insight/Motivation None   Affect/Mood Display Unable to Assess   Cognition Unable to Assess   Psychomotor Unable to Assess

## 2022-08-11 NOTE — PLAN OF CARE
08/10/22 2000   Pain/Comfort/Sleep   Preferred Pain Scale number (Numeric Rating Pain Scale)   Comfort/Acceptable Pain Level 0   Pain Rating (0-10): Rest 0   Pain Rating (0-10): Activity 0   Coping/Psychosocial   Verbalized Emotional State anxiety   Plan of Care Reviewed With patient   Behavioral   General Appearance WDL WDL   Emotion Mood WDL   Emotion/Mood/Affect WDL WDL   Affect flat   Emotion/Mood anxious   Speech WDL   Speech WDL WDL   Perceptual State WDL   Perceptual State WDL WDL   Hallucinations denies hallucinations   Perceptual State consistent with reality   Thought Process WDL   Thought Process WDL ex;judgment and insight   Delusions paranoid   Intellectual Performance WDL   Intellectual Performance WDL WDL   Intellectual Performance oriented x 4   AIMS   Face/Oral Movement: Face Muscle Express 0-->none   Face/Oral Movement: Lips/Perioral 0-->none   Face/Oral Movement: Jaw 0-->none   Face/Oral Movement: Tongue 0-->none   Extremity Movement: Upper 0-->none   Extremity Movement: Lower 0-->none   Trunk Movement: Neck/Shoulder/Hips 0-->none   Global Judgment: Severity 0-->none, normal   Global Judgment: Incapacitated 0-->none, normal   Global Judgment: Awareness 0-->no awareness   Dental: Current Problems no   Dental: Dentures Worn no   Additional Info: Edentia no   Additional Info: Disappear with Sleep no   Cognitive   Cognitive/Neuro/Behavioral WDL WD   Safety   Patient Location patient room, own   Safety Measures safety rounds completed   ABC Risk for Fall with Injury Assessment   A= Age: Is the patient greater than or equal to 85 years old or frail due to clinical condition? No   B=Bones: Does the patient have osteoporosis, previous fracture, prolonged steroid use, or metastatic bone cancer? No   C=Coagulation Disorders: Does the patient have a bleeding disorder, either through anticoagulants or underlying clinical condition? No   S=recent Surgery: Is the patient post-op surgicalwith a recent lower  limb amputation or recent major abdominal or thoracic surgery? No   Violence Risk   Feels Like Hurting Others no   Previous Attempt to Harm Others no   Safety Management    Patient Rounds visualized patient   Safety Promotion/Fall Prevention nonskid shoes/socks when out of bed   Daily Care   Activity (Behavioral Health) up ad fanta   Activity   Activity Assistance Provided independent   Positioning   Body Position position changed independently   Gastrointestinal   GI WDL WDL   Last Bowel Movement 08/06/22   Genitourinary   Genitourinary WDL WDL   Skin   Skin WDL WDL   Roderick Risk Assessment   Sensory Perception 4-->no impairment   Moisture 4-->rarely moist   Activity 4-->walks frequently   Mobility 4-->no limitation   Nutrition 3-->adequate   Friction and Shear 3-->no apparent problem   Roderick Score 22   RN Clinical Review   I have evaluated the data collected on this patient and nursing care provided. Done

## 2022-08-11 NOTE — PROGRESS NOTES
08/11/22 1400   [facility] Group Therapy   Group Name Therapeutic Recreation   Specific Interventions Skilled Activity Creative Expression   Participation Level None   Participation Quality Refused;Sleeping   Insight/Motivation None   Affect/Mood Display Unable to Assess   Cognition Unable to Assess   Psychomotor Unable to Assess

## 2022-08-11 NOTE — PROGRESS NOTES
8/11/2022 4:33 PM   Keshav Godfrey   1985   [id number]        Psychiatry Progress Note     SUBJECTIVE:   Keshav Godfrey is a 37 y.o. male placed under a PEC at [facility] due to reported suicidal ideation.  He reports that he is agitated this morning.  Also states that he has been depressed.  We discussed added a smaller dose of Seroquel in the morning.  He has not tried this before, but he is amenable to this.  Due for discharge to [facility] tomorrow.       Current Medications:   Scheduled Meds:    amLODIPine  5 mg Oral Daily    divalproex  250 mg Oral BID    hydrOXYzine HCL  25 mg Oral TID    lisinopriL  40 mg Oral Daily    nicotine  1 patch Transdermal Daily    pantoprazole  40 mg Oral Daily    QUEtiapine  300 mg Oral QHS      PRN Meds: acetaminophen, aluminum-magnesium hydroxide-simethicone, diphenhydrAMINE, haloperidol lactate, haloperidoL, hydrOXYzine HCL, loperamide, lorazepam, LORazepam, magnesium hydroxide 400 mg/5 ml, ondansetron, trazodone   Psychotherapeutics (From admission, onward)            Start     Stop Route Frequency Ordered    08/08/22 2100  QUEtiapine tablet 300 mg         -- Oral Nightly 08/08/22 1135    08/07/22 0442  haloperidol lactate injection 10 mg         -- IM Every 6 hours PRN 08/07/22 0442    08/07/22 0442  haloperidoL tablet 10 mg         -- Oral Every 6 hours PRN 08/07/22 0442    08/07/22 0442  lorazepam injection 2 mg         -- IM Every 6 hours PRN 08/07/22 0442    08/07/22 0442  LORazepam tablet 2 mg         -- Oral Every 6 hours PRN 08/07/22 0442    08/07/22 0442  traZODone tablet 100 mg         -- Oral Nightly PRN 08/07/22 0442          Allergies:   Review of patient's allergies indicates:   Allergen Reactions    Buspirone     Codeine     Penicillins         OBJECTIVE:   Vitals   Vitals:    08/11/22 1100   BP: (!) 137/93   Pulse: 104   Resp: 18   Temp: 98.6 °F (37 °C)        Labs/Imaging/Studies:   No results found for this or any previous visit (from the  "past 36 hour(s)).     Psychiatric Mental Status Exam:  General Appearance: appears stated age, well-developed, well-nourished  Arousal: alert  Behavior: cooperative  Movements and Motor Activity: no abnormal involuntary movements noted  Orientation: oriented to person, place, time, and situation  Speech: normal rate, normal rhythm, normal volume, normal tone  Mood: "Agitated"  Affect: Restricted  Thought Process: linear  Associations: intact  Thought Content and Perceptions: denies suicidal ideation, no homicidal ideation, no auditory hallucinations, no visual hallucinations, no paranoid ideation, no ideas of reference, no evidence of delusions or psychosis  Recent and Remote Memory: recent memory intact, remote memory intact  Attention and Concentration: intact, attentive to conversation  Fund of Knowledge: intact, aware of current events, vocabulary appropriate  Insight: intact  Judgment: questionable    ASSESSMENT/PLAN:   Diagnosis:  Unspecified Depression (F32.9)  Opioid use disorder (F11.20)  Amphetamine use disorder (F15.10)  Cannabis use disorder (F12.10)    Past Medical History:   Diagnosis Date    Bipolar 1 disorder         Plan:  -Add Seroquel 50mg daily  -Proceed with discharge tomorrow    Estimated Disposition: Substance treatment program      Gerald Pike M.D.  8/11/2022  "

## 2022-08-11 NOTE — PROGRESS NOTES
08/11/22 1000   [facility]   Group Name Therapeutic Recreation   Specific Interventions Skilled Activity Mild Exercises   Participation Level None   Participation Quality Refused;Sleeping   Insight/Motivation None   Affect/Mood Display Unable to Assess   Cognition Unable to Assess   Psychomotor Unable to Assess

## 2022-08-12 VITALS
BODY MASS INDEX: 25.87 KG/M2 | HEART RATE: 92 BPM | OXYGEN SATURATION: 99 % | SYSTOLIC BLOOD PRESSURE: 157 MMHG | RESPIRATION RATE: 18 BRPM | WEIGHT: 195.19 LBS | DIASTOLIC BLOOD PRESSURE: 82 MMHG | TEMPERATURE: 97 F | HEIGHT: 73 IN

## 2022-08-12 PROCEDURE — 25000003 PHARM REV CODE 250: Performed by: PEDIATRICS

## 2022-08-12 PROCEDURE — 25000003 PHARM REV CODE 250: Performed by: PSYCHIATRY & NEUROLOGY

## 2022-08-12 PROCEDURE — 25000003 PHARM REV CODE 250: Performed by: FAMILY MEDICINE

## 2022-08-12 RX ADMIN — HYDROXYZINE HYDROCHLORIDE 25 MG: 25 TABLET ORAL at 09:08

## 2022-08-12 RX ADMIN — HYDROXYZINE HYDROCHLORIDE 50 MG: 50 TABLET, FILM COATED ORAL at 12:08

## 2022-08-12 RX ADMIN — LISINOPRIL 40 MG: 10 TABLET ORAL at 08:08

## 2022-08-12 RX ADMIN — PANTOPRAZOLE SODIUM 40 MG: 40 TABLET, DELAYED RELEASE ORAL at 08:08

## 2022-08-12 RX ADMIN — HYDROXYZINE HYDROCHLORIDE 50 MG: 50 TABLET, FILM COATED ORAL at 08:08

## 2022-08-12 RX ADMIN — QUETIAPINE 50 MG: 25 TABLET ORAL at 09:08

## 2022-08-12 RX ADMIN — DIVALPROEX SODIUM 250 MG: 250 TABLET, DELAYED RELEASE ORAL at 08:08

## 2022-08-12 RX ADMIN — AMLODIPINE BESYLATE 5 MG: 5 TABLET ORAL at 09:08

## 2022-08-12 RX ADMIN — ACETAMINOPHEN 325MG 650 MG: 325 TABLET ORAL at 09:08

## 2022-08-12 NOTE — PLAN OF CARE
Problem: Adult Inpatient Plan of Care  Goal: Plan of Care Review  Outcome: Met  Goal: Patient-Specific Goal (Individualized)  Outcome: Met  Goal: Absence of Hospital-Acquired Illness or Injury  Outcome: Met  Goal: Optimal Comfort and Wellbeing  Outcome: Met  Goal: Readiness for Transition of Care  Outcome: Met     Problem: Activity and Energy Impairment (Depressive Signs/Symptoms)  Goal: Optimized Energy Level (Depressive Signs/Symptoms)  Outcome: Met     Problem: Cognitive Impairment (Depressive Signs/Symptoms)  Goal: Optimized Cognitive Function  Outcome: Met     Problem: Decreased Participation/Engagement (Depressive Signs/Symptoms)  Goal: Increased Participation and Engagement (Depressive Signs/Symptoms)  Outcome: Met     Problem: Feelings of Worthlessness, Hopelessness or Excessive Guilt (Depressive Signs/Symptoms)  Goal: Enhanced Self-Esteem and Confidence (Depressive Signs/Symptoms)  Outcome: Met     Problem: Mood Impairment (Depressive Signs/Symptoms)  Goal: Improved Mood Symptoms (Depressive Signs/Symptoms)  Outcome: Met     Problem: Nutrition Imbalance (Depressive Signs/Symptoms)  Goal: Optimized Nutrition Intake  Outcome: Met     Problem: Psychomotor Impairment (Depressive Signs/Symptoms)  Goal: Improved Psychomotor Symptoms (Depressive Signs/Symptoms)  Outcome: Met     Problem: Sleep Disturbance (Depressive Signs/Symptoms)  Goal: Improved Sleep (Depressive Signs/Symptoms)  Outcome: Met     Problem: Social, Occupational or Functional Impairment (Depressive Signs/Symptoms)  Goal: Enhanced Social, Occupational or Functional Skills (Depressive Signs/Symptoms)  Outcome: Met

## 2022-08-12 NOTE — PROGRESS NOTES
08/12/22 1400   [facility] Group Therapy   Group Name Therapeutic Recreation   Specific Interventions Skilled Activity Leisure Education and Awareness   Participation Level Minimal   Participation Quality Cooperative   Insight/Motivation None   Affect/Mood Display Appropriate   Cognition Oriented   Psychomotor WNL

## 2022-08-12 NOTE — PROGRESS NOTES
08/12/22 1000   [facility] Group Therapy   Group Name Therapeutic Recreation   Specific Interventions Skilled Activity Mild Exercises   Participation Level None   Participation Quality Refused   Insight/Motivation None   Affect/Mood Display Unable to Assess   Cognition Unable to Assess   Psychomotor Unable to Assess

## 2022-08-12 NOTE — PLAN OF CARE
Problem: Adult Inpatient Plan of Care  Goal: Plan of Care Review  Outcome: Ongoing, Not Progressing  Goal: Patient-Specific Goal (Individualized)  Outcome: Ongoing, Not Progressing  Goal: Absence of Hospital-Acquired Illness or Injury  Outcome: Ongoing, Not Progressing  Goal: Optimal Comfort and Wellbeing  Outcome: Ongoing, Not Progressing  Goal: Readiness for Transition of Care  Outcome: Ongoing, Not Progressing     Problem: Activity and Energy Impairment (Depressive Signs/Symptoms)  Goal: Optimized Energy Level (Depressive Signs/Symptoms)  Outcome: Ongoing, Not Progressing     Problem: Cognitive Impairment (Depressive Signs/Symptoms)  Goal: Optimized Cognitive Function  Outcome: Ongoing, Not Progressing     Problem: Decreased Participation/Engagement (Depressive Signs/Symptoms)  Goal: Increased Participation and Engagement (Depressive Signs/Symptoms)  Outcome: Ongoing, Not Progressing     Problem: Feelings of Worthlessness, Hopelessness or Excessive Guilt (Depressive Signs/Symptoms)  Goal: Enhanced Self-Esteem and Confidence (Depressive Signs/Symptoms)  Outcome: Ongoing, Not Progressing     Problem: Mood Impairment (Depressive Signs/Symptoms)  Goal: Improved Mood Symptoms (Depressive Signs/Symptoms)  Outcome: Ongoing, Not Progressing     Problem: Nutrition Imbalance (Depressive Signs/Symptoms)  Goal: Optimized Nutrition Intake  Outcome: Ongoing, Not Progressing     Problem: Psychomotor Impairment (Depressive Signs/Symptoms)  Goal: Improved Psychomotor Symptoms (Depressive Signs/Symptoms)  Outcome: Ongoing, Not Progressing     Problem: Sleep Disturbance (Depressive Signs/Symptoms)  Goal: Improved Sleep (Depressive Signs/Symptoms)  Outcome: Ongoing, Not Progressing     Problem: Social, Occupational or Functional Impairment (Depressive Signs/Symptoms)  Goal: Enhanced Social, Occupational or Functional Skills (Depressive Signs/Symptoms)  Outcome: Ongoing, Not Progressing

## 2022-08-12 NOTE — DISCHARGE SUMMARY
"DISCHARGE SUMMARY  PSYCHIATRY      Admit Date: 8/7/2022  4:14 AM    Discharge Date:  8/12/2022    SITE:   [facility]    Discharge Attending Physician: Gerald Pike MD    History of Present Illness On Admit:   Keshav Godfrey is a 37 y.o. male placed under a PEC at [facility] due to reported suicidal ideation.  He states that he was at [place] "a few weeks ago".   He states that he relapsed the day that he got back from [place].  Uses methamphetamine and heroin (intranasal and smoking, as well as eating).  He is homeless and states that he is interested in rehab.  He reports that Haldol helps with his anxiety and withdrawal.  He is also hypertensive and states that his home lisinopril had to be increased so will have medical see him today to evaluate.     UDS: (+)Opioids, amphetamines, Cannabinoids, Fentanyl     No opioids on UDS on 7/15/22    Diagnoses:  PRINCIPAL PROBLEM: Major depressive disorder, single episode, unspecified    PROBLEM LIST    Major depressive disorder, single episode, unspecified    Essential hypertension    Opioid dependence    Methamphetamine use    Cannabis abuse      Discharged Condition: [facility] Course:   Patient was admitted to [facility].  He was initiated on a clonidine detox protocol for opiates, which he tolerated well.  He did present with some irritability, however, no overt behavioral issues.  His home medications of Depakote and Seroquel were resumed to assist with his mood.  He was compliant with this medication and no adverse effects were noted.  Staff was able to secure rehab placement at [facility]. At the time of discharge he denied SI and HI as well as AH and VH; no tremors, rigidity, extrapyramidal symptoms, or excessive sedation were noted.    Disposition: Discharged to Rehab    DISCHARGE EXAMINATION    VITALS   Vitals:    08/11/22 0400 08/11/22 0900 08/11/22 1100 08/12/22 0800   BP: (!) 138/96 134/72 (!) 137/93 " "(!) 157/82   Pulse: 103  104 92   Resp: 20 18 18   Temp: 97.9 °F (36.6 °C)  98.6 °F (37 °C) 97.2 °F (36.2 °C)   TempSrc:       SpO2: 99%  98% 99%   Weight:       Height:             General Appearance: appears stated age, well-developed, well-nourished  Arousal: alert  Behavior: cooperative  Movements and Motor Activity: no abnormal involuntary movements noted  Orientation: oriented to person, place, time, and situation  Speech: normal rate, normal rhythm, normal volume, normal tone  Mood: "Agitated"  Affect: Restricted  Thought Process: linear  Associations: intact  Thought Content and Perceptions: denies suicidal ideation, no homicidal ideation, no auditory hallucinations, no visual hallucinations, no paranoid ideation, no ideas of reference, no evidence of delusions or psychosis  Recent and Remote Memory: recent memory intact, remote memory intact  Attention and Concentration: intact, attentive to conversation  Fund of Knowledge: intact, aware of current events, vocabulary appropriate  Insight: intact  Judgment: questionable      Medication Regimen:    Current Facility-Administered Medications:     acetaminophen tablet 650 mg, 650 mg, Oral, Q6H PRN, Gerald Pike MD, 650 mg at 08/12/22 0904    aluminum-magnesium hydroxide-simethicone 200-200-20 mg/5 mL suspension 30 mL, 30 mL, Oral, Q6H PRN, Gerald Pike MD    amLODIPine tablet 5 mg, 5 mg, Oral, Daily, Ivan Orozco MD, 5 mg at 08/12/22 0900    diphenhydrAMINE capsule 50 mg, 50 mg, Oral, Q6H PRN, Gerald Pike MD, 50 mg at 08/08/22 2234    divalproex EC tablet 250 mg, 250 mg, Oral, BID, Gerald Pike MD, 250 mg at 08/12/22 0831    haloperidol lactate injection 10 mg, 10 mg, Intramuscular, Q6H PRN, Gerald Pike MD, 10 mg at 08/08/22 1701    haloperidoL tablet 10 mg, 10 mg, Oral, Q6H PRN, Gerald Pike MD, 10 mg at 08/08/22 2234    hydrOXYzine HCL tablet 25 mg, 25 mg, Oral, TID, Gerald Pike MD, " 25 mg at 08/12/22 0900    hydrOXYzine tablet 50 mg, 50 mg, Oral, Q4H PRN, Gerald Pike MD, 50 mg at 08/12/22 0832    lisinopriL tablet 40 mg, 40 mg, Oral, Daily, Ivan Orozco MD, 40 mg at 08/12/22 0831    loperamide capsule 2 mg, 2 mg, Oral, Q4H PRN, Gerald Pike MD    lorazepam injection 2 mg, 2 mg, Intramuscular, Q6H PRN, Gerald Pike MD    LORazepam tablet 2 mg, 2 mg, Oral, Q6H PRN, Gerald Pike MD, 2 mg at 08/07/22 1726    magnesium hydroxide 400 mg/5 ml suspension 2,400 mg, 30 mL, Oral, Daily PRN, Gerald Pkie MD    nicotine 21 mg/24 hr 1 patch, 1 patch, Transdermal, Daily, Gerald Pike MD, 1 patch at 08/11/22 0924    ondansetron disintegrating tablet 8 mg, 8 mg, Oral, Q8H PRN, Gerald Pike MD, 8 mg at 08/09/22 1556    pantoprazole EC tablet 40 mg, 40 mg, Oral, Daily, Mike Wolfe MD, 40 mg at 08/12/22 0831    QUEtiapine tablet 300 mg, 300 mg, Oral, QHS, Gerald Pike MD, 300 mg at 08/11/22 2030    QUEtiapine tablet 50 mg, 50 mg, Oral, Daily, Gerald Pike MD, 50 mg at 08/12/22 0900    traZODone tablet 100 mg, 100 mg, Oral, Nightly PRN, Gerald Pike MD, 100 mg at 08/11/22 2222      Patient Instructions:   Continue medication regimen as prescribed.    Disposition plan per  - see  notes for details.    Patient instructed to call 911 or present to emergency department if any of the following complications develop status post discharge: suicidality, homicidality, or grave disability.     Total time spent discharging patient: <30 minutes      MISSY Robles-BC

## 2022-08-12 NOTE — PROGRESS NOTES
"8/12/2022 1:40 PM   Keshav Godfrey   1985   [id number]        Psychiatry Progress Note     SUBJECTIVE:   Keshav Godfrey is a 37 y.o. male placed under a PEC at [facility] due to reported suicidal ideation. Low dose Seroquel added yesterday and he notes the benefits to this. Less agitated today. Today, he reports that his mood is "great". Patient denies suicidal and homicidal ideations today. Patient denies any adverse effects to the medication regimen; no evidence of EPS, TD, or dystonia. He is being discharged today to [facility].        Current Medications:   Scheduled Meds:    amLODIPine  5 mg Oral Daily    divalproex  250 mg Oral BID    hydrOXYzine HCL  25 mg Oral TID    lisinopriL  40 mg Oral Daily    nicotine  1 patch Transdermal Daily    pantoprazole  40 mg Oral Daily    QUEtiapine  300 mg Oral QHS    QUEtiapine  50 mg Oral Daily      PRN Meds: acetaminophen, aluminum-magnesium hydroxide-simethicone, diphenhydrAMINE, haloperidol lactate, haloperidoL, hydrOXYzine HCL, loperamide, lorazepam, LORazepam, magnesium hydroxide 400 mg/5 ml, ondansetron, trazodone   Psychotherapeutics (From admission, onward)            Start     Stop Route Frequency Ordered    08/11/22 1315  QUEtiapine tablet 50 mg         -- Oral Daily 08/11/22 1313    08/08/22 2100  QUEtiapine tablet 300 mg         -- Oral Nightly 08/08/22 1135    08/07/22 0442  haloperidol lactate injection 10 mg         -- IM Every 6 hours PRN 08/07/22 0442    08/07/22 0442  haloperidoL tablet 10 mg         -- Oral Every 6 hours PRN 08/07/22 0442    08/07/22 0442  lorazepam injection 2 mg         -- IM Every 6 hours PRN 08/07/22 0442    08/07/22 0442  LORazepam tablet 2 mg         -- Oral Every 6 hours PRN 08/07/22 0442    08/07/22 0442  traZODone tablet 100 mg         -- Oral Nightly PRN 08/07/22 0442          Allergies:   Review of patient's allergies indicates:   Allergen Reactions    Buspirone     Codeine     Penicillins     " "    OBJECTIVE:   Vitals   Vitals:    08/12/22 0800   BP: (!) 157/82   Pulse: 92   Resp: 18   Temp: 97.2 °F (36.2 °C)        Labs/Imaging/Studies:   No results found for this or any previous visit (from the past 36 hour(s)).     Psychiatric Mental Status Exam:  General Appearance: appears stated age, well-developed, well-nourished  Arousal: alert  Behavior: cooperative  Movements and Motor Activity: no abnormal involuntary movements noted  Orientation: oriented to person, place, time, and situation  Speech: normal rate, normal rhythm, normal volume, normal tone  Mood: "Agitated"  Affect: Restricted  Thought Process: linear  Associations: intact  Thought Content and Perceptions: denies suicidal ideation, no homicidal ideation, no auditory hallucinations, no visual hallucinations, no paranoid ideation, no ideas of reference, no evidence of delusions or psychosis  Recent and Remote Memory: recent memory intact, remote memory intact  Attention and Concentration: intact, attentive to conversation  Fund of Knowledge: intact, aware of current events, vocabulary appropriate  Insight: intact  Judgment: questionable    ASSESSMENT/PLAN:   Diagnosis:  Unspecified Depression (F32.9)  Opioid use disorder (F11.20)  Amphetamine use disorder (F15.10)  Cannabis use disorder (F12.10)    Past Medical History:   Diagnosis Date    Bipolar 1 disorder         Plan:  -Proceed with discharge today     Estimated Disposition: Substance treatment program    MISSY Robles-BC  8/12/2022  "

## 2023-08-09 ENCOUNTER — HOSPITAL ENCOUNTER (EMERGENCY)
Facility: HOSPITAL | Age: 38
Discharge: PSYCHIATRIC OR SUBSTANCE USE FACILITY | End: 2023-08-09
Attending: EMERGENCY MEDICINE
Payer: MEDICAID

## 2023-08-09 ENCOUNTER — HOSPITAL ENCOUNTER (INPATIENT)
Facility: HOSPITAL | Age: 38
LOS: 7 days | Discharge: HOME | DRG: 885 | End: 2023-08-16
Attending: PSYCHIATRY & NEUROLOGY | Admitting: PSYCHIATRY & NEUROLOGY
Payer: MEDICAID

## 2023-08-09 VITALS
SYSTOLIC BLOOD PRESSURE: 128 MMHG | DIASTOLIC BLOOD PRESSURE: 94 MMHG | HEIGHT: 72 IN | OXYGEN SATURATION: 97 % | BODY MASS INDEX: 25.73 KG/M2 | HEART RATE: 108 BPM | TEMPERATURE: 98 F | WEIGHT: 190 LBS | RESPIRATION RATE: 18 BRPM

## 2023-08-09 DIAGNOSIS — F32.A ACUTE DEPRESSION: Primary | ICD-10-CM

## 2023-08-09 DIAGNOSIS — F20.9 SCHIZOPHRENIA: ICD-10-CM

## 2023-08-09 DIAGNOSIS — R45.851 SUICIDAL IDEATION: ICD-10-CM

## 2023-08-09 LAB
ALBUMIN SERPL-MCNC: 4.6 G/DL (ref 3.5–5)
ALBUMIN/GLOB SERPL: 1.6 RATIO (ref 1.1–2)
ALP SERPL-CCNC: 69 UNIT/L (ref 40–150)
ALT SERPL-CCNC: 45 UNIT/L (ref 0–55)
AMPHET UR QL SCN: POSITIVE
APAP SERPL-MCNC: <17.4 UG/ML (ref 17.4–30)
APPEARANCE UR: ABNORMAL
AST SERPL-CCNC: 28 UNIT/L (ref 5–34)
BACTERIA #/AREA URNS AUTO: NORMAL /HPF
BARBITURATE SCN PRESENT UR: NEGATIVE
BASOPHILS # BLD AUTO: 0.05 X10(3)/MCL
BASOPHILS NFR BLD AUTO: 0.9 %
BENZODIAZ UR QL SCN: NEGATIVE
BILIRUB SERPL-MCNC: 1.4 MG/DL
BILIRUB UR QL STRIP.AUTO: NEGATIVE
BUN SERPL-MCNC: 12.8 MG/DL (ref 8.9–20.6)
CALCIUM SERPL-MCNC: 9.9 MG/DL (ref 8.4–10.2)
CANNABINOIDS UR QL SCN: NEGATIVE
CHLORIDE SERPL-SCNC: 102 MMOL/L (ref 98–107)
CO2 SERPL-SCNC: 21 MMOL/L (ref 22–29)
COCAINE UR QL SCN: NEGATIVE
COLOR UR: ABNORMAL
CREAT SERPL-MCNC: 0.85 MG/DL (ref 0.73–1.18)
EOSINOPHIL # BLD AUTO: 0.02 X10(3)/MCL (ref 0–0.9)
EOSINOPHIL NFR BLD AUTO: 0.3 %
ERYTHROCYTE [DISTWIDTH] IN BLOOD BY AUTOMATED COUNT: 11.9 % (ref 11.5–17)
ETHANOL SERPL-MCNC: <10 MG/DL
FENTANYL UR QL SCN: NEGATIVE
GFR SERPLBLD CREATININE-BSD FMLA CKD-EPI: >60 MLS/MIN/1.73/M2
GLOBULIN SER-MCNC: 2.8 GM/DL (ref 2.4–3.5)
GLUCOSE SERPL-MCNC: 146 MG/DL (ref 74–100)
GLUCOSE UR QL STRIP.AUTO: NEGATIVE
HCT VFR BLD AUTO: 42.2 % (ref 42–52)
HGB BLD-MCNC: 15.5 G/DL (ref 14–18)
IMM GRANULOCYTES # BLD AUTO: 0.02 X10(3)/MCL (ref 0–0.04)
IMM GRANULOCYTES NFR BLD AUTO: 0.3 %
KETONES UR QL STRIP.AUTO: ABNORMAL
LEUKOCYTE ESTERASE UR QL STRIP.AUTO: NEGATIVE
LYMPHOCYTES # BLD AUTO: 1.7 X10(3)/MCL (ref 0.6–4.6)
LYMPHOCYTES NFR BLD AUTO: 29.5 %
MCH RBC QN AUTO: 28.9 PG (ref 27–31)
MCHC RBC AUTO-ENTMCNC: 36.7 G/DL (ref 33–36)
MCV RBC AUTO: 78.7 FL (ref 80–94)
MDMA UR QL SCN: NEGATIVE
MONOCYTES # BLD AUTO: 0.59 X10(3)/MCL (ref 0.1–1.3)
MONOCYTES NFR BLD AUTO: 10.2 %
NEUTROPHILS # BLD AUTO: 3.39 X10(3)/MCL (ref 2.1–9.2)
NEUTROPHILS NFR BLD AUTO: 58.8 %
NITRITE UR QL STRIP.AUTO: NEGATIVE
NRBC BLD AUTO-RTO: 0 %
OPIATES UR QL SCN: NEGATIVE
PCP UR QL: NEGATIVE
PH UR STRIP.AUTO: 7 [PH]
PH UR: 7 [PH] (ref 3–11)
PLATELET # BLD AUTO: 215 X10(3)/MCL (ref 130–400)
PMV BLD AUTO: 9.6 FL (ref 7.4–10.4)
POTASSIUM SERPL-SCNC: 3.9 MMOL/L (ref 3.5–5.1)
PROT SERPL-MCNC: 7.4 GM/DL (ref 6.4–8.3)
PROT UR QL STRIP.AUTO: NEGATIVE
RBC # BLD AUTO: 5.36 X10(6)/MCL (ref 4.7–6.1)
RBC #/AREA URNS AUTO: NORMAL /HPF
RBC UR QL AUTO: NEGATIVE
SALICYLATES SERPL-MCNC: <5 MG/DL (ref 15–30)
SARS-COV-2 RDRP RESP QL NAA+PROBE: NEGATIVE
SODIUM SERPL-SCNC: 134 MMOL/L (ref 136–145)
SP GR UR STRIP.AUTO: 1.02 (ref 1–1.03)
SPECIFIC GRAVITY, URINE AUTO (.000) (OHS): 1.02 (ref 1–1.03)
SQUAMOUS #/AREA URNS AUTO: NORMAL /HPF
TSH SERPL-ACNC: 1.53 UIU/ML (ref 0.35–4.94)
UROBILINOGEN UR STRIP-ACNC: 1
WBC # SPEC AUTO: 5.77 X10(3)/MCL (ref 4.5–11.5)
WBC #/AREA URNS AUTO: NORMAL /HPF

## 2023-08-09 PROCEDURE — 85025 COMPLETE CBC W/AUTO DIFF WBC: CPT | Performed by: NURSE PRACTITIONER

## 2023-08-09 PROCEDURE — 11400000 HC PSYCH PRIVATE ROOM

## 2023-08-09 PROCEDURE — 81001 URINALYSIS AUTO W/SCOPE: CPT | Mod: 59 | Performed by: NURSE PRACTITIONER

## 2023-08-09 PROCEDURE — 99285 EMERGENCY DEPT VISIT HI MDM: CPT

## 2023-08-09 PROCEDURE — 80053 COMPREHEN METABOLIC PANEL: CPT | Performed by: NURSE PRACTITIONER

## 2023-08-09 PROCEDURE — 84443 ASSAY THYROID STIM HORMONE: CPT | Performed by: NURSE PRACTITIONER

## 2023-08-09 PROCEDURE — 80143 DRUG ASSAY ACETAMINOPHEN: CPT | Performed by: NURSE PRACTITIONER

## 2023-08-09 PROCEDURE — 80307 DRUG TEST PRSMV CHEM ANLYZR: CPT | Performed by: NURSE PRACTITIONER

## 2023-08-09 PROCEDURE — 82077 ASSAY SPEC XCP UR&BREATH IA: CPT | Performed by: NURSE PRACTITIONER

## 2023-08-09 PROCEDURE — 63600175 PHARM REV CODE 636 W HCPCS: Performed by: EMERGENCY MEDICINE

## 2023-08-09 PROCEDURE — 25000003 PHARM REV CODE 250: Performed by: EMERGENCY MEDICINE

## 2023-08-09 PROCEDURE — 80179 DRUG ASSAY SALICYLATE: CPT | Performed by: NURSE PRACTITIONER

## 2023-08-09 PROCEDURE — 96372 THER/PROPH/DIAG INJ SC/IM: CPT | Performed by: EMERGENCY MEDICINE

## 2023-08-09 PROCEDURE — 87635 SARS-COV-2 COVID-19 AMP PRB: CPT | Performed by: NURSE PRACTITIONER

## 2023-08-09 RX ORDER — HYDROXYZINE PAMOATE 25 MG/1
50 CAPSULE ORAL EVERY 6 HOURS PRN
Status: DISCONTINUED | OUTPATIENT
Start: 2023-08-09 | End: 2023-08-16 | Stop reason: HOSPADM

## 2023-08-09 RX ORDER — LORAZEPAM 1 MG/1
2 TABLET ORAL
Status: COMPLETED | OUTPATIENT
Start: 2023-08-09 | End: 2023-08-09

## 2023-08-09 RX ORDER — ACETAMINOPHEN 325 MG/1
650 TABLET ORAL EVERY 6 HOURS PRN
Status: DISCONTINUED | OUTPATIENT
Start: 2023-08-09 | End: 2023-08-16 | Stop reason: HOSPADM

## 2023-08-09 RX ORDER — ACETAMINOPHEN 500 MG
1000 TABLET ORAL
Status: COMPLETED | OUTPATIENT
Start: 2023-08-09 | End: 2023-08-09

## 2023-08-09 RX ORDER — ZIPRASIDONE MESYLATE 20 MG/ML
20 INJECTION, POWDER, LYOPHILIZED, FOR SOLUTION INTRAMUSCULAR
Status: COMPLETED | OUTPATIENT
Start: 2023-08-09 | End: 2023-08-09

## 2023-08-09 RX ADMIN — ACETAMINOPHEN 1000 MG: 500 TABLET, FILM COATED ORAL at 04:08

## 2023-08-09 RX ADMIN — LORAZEPAM 2 MG: 1 TABLET ORAL at 04:08

## 2023-08-09 RX ADMIN — ZIPRASIDONE MESYLATE 20 MG: 20 INJECTION, POWDER, LYOPHILIZED, FOR SOLUTION INTRAMUSCULAR at 05:08

## 2023-08-09 NOTE — ED PROVIDER NOTES
Encounter Date: 8/9/2023       History     Chief Complaint   Patient presents with    Psychiatric Evaluation     C/o SI and HI x2-3 days, reports was planning to drive himself head on into traffic, having auditory and visual hallucinations, noncompliant with medications.      38-year-old white male presents to the emergency department complaining of suicidal ideations specifically wants to drive himself into oncoming traffic and die.  Triage note indicates homicidal ideation but he specifically denies this to me.  He says he is from the [facility] but came down here to visit and never left.  He has a diagnosis of bipolar/manic depressive with schizophrenia but does not take his medications but cannot tell me why he does not take them.  He states he does take his Suboxone.  He says he was recently in a mental health facility but was not tell me when or where.  He states he needs help        Review of patient's allergies indicates:   Allergen Reactions    Buspirone     Codeine     Penicillins      Past Medical History:   Diagnosis Date    Bipolar 1 disorder     Hypertension      History reviewed. No pertinent surgical history.  History reviewed. No pertinent family history.  Social History     Tobacco Use    Smoking status: Every Day     Current packs/day: 0.00     Types: Cigarettes   Substance Use Topics    Alcohol use: Not Currently    Drug use: Yes     Types: Methamphetamines     Comment: Methadone     Review of Systems   Constitutional: Negative.    HENT: Negative.     Respiratory: Negative.     Cardiovascular: Negative.    Gastrointestinal: Negative.    Genitourinary: Negative.    Musculoskeletal: Negative.    Psychiatric/Behavioral:  Positive for agitation, dysphoric mood and suicidal ideas. The patient is nervous/anxious.        Physical Exam     Initial Vitals [08/09/23 1027]   BP Pulse Resp Temp SpO2   (!) 136/98 (!) 117 20 98.2 °F (36.8 °C) 97 %      MAP       --         Physical Exam    Vitals  reviewed.  Constitutional: He appears well-developed and well-nourished.   HENT:   Head: Normocephalic and atraumatic.   Eyes: Conjunctivae and EOM are normal. Pupils are equal, round, and reactive to light.   Cardiovascular:  Normal rate.           Pulmonary/Chest: Breath sounds normal.   Abdominal: Abdomen is soft.     Neurological: He is alert and oriented to person, place, and time. He has normal strength. No cranial nerve deficit or sensory deficit. GCS score is 15. GCS eye subscore is 4. GCS verbal subscore is 5. GCS motor subscore is 6.   Skin: Skin is warm. Capillary refill takes less than 2 seconds.   Psychiatric: His speech is normal. His affect is blunt. He is withdrawn. He exhibits a depressed mood. He expresses suicidal ideation. He expresses suicidal plans (drive himself into traffic).         ED Course   Procedures  Labs Reviewed   COMPREHENSIVE METABOLIC PANEL - Abnormal; Notable for the following components:       Result Value    Sodium Level 134 (*)     Carbon Dioxide 21 (*)     Glucose Level 146 (*)     All other components within normal limits   URINALYSIS, REFLEX TO URINE CULTURE - Abnormal; Notable for the following components:    Appearance, UA Cloudy (*)     Ketones, UA Trace (*)     All other components within normal limits   DRUG SCREEN, URINE (BEAKER) - Abnormal; Notable for the following components:    Amphetamines, Urine Positive (*)     All other components within normal limits    Narrative:     Cut off concentrations:    Amphetamines - 1000 ng/ml  Barbiturates - 200 ng/ml  Benzodiazepine - 200 ng/ml  Cannabinoids (THC) - 50 ng/ml  Cocaine - 300 ng/ml  Fentanyl - 1.0 ng/ml  MDMA - 500 ng/ml  Opiates - 300 ng/ml   Phencyclidine (PCP) - 25 ng/ml    Specimen submitted for drug analysis and tested for pH and specific gravity in order to evaluate sample integrity. Suspect tampering if specific gravity is <1.003 and/or pH is not within the range of 4.5 - 8.0  False negatives may result form  substances such as bleach added to urine.  False positives may result for the presence of a substance with similar chemical structure to the drug or its metabolite.    This test provides only a PRELIMINARY analytical test result. A more specific alternate chemical method must be used in order to obtain a confirmed analytical result. Gas chromatography/mass spectrometry (GC/MS) is the preferred confirmatory method. Other chemical confirmation methods are available. Clinical consideration and professional judgement should be applied to any drug of abuse test result, particularly when preliminary positive results are used.    Positive results will be confirmed only at the physicians request. Unconfirmed screening results are to be used only for medical purposes (treatment).        ACETAMINOPHEN LEVEL - Abnormal; Notable for the following components:    Acetaminophen Level <17.4 (*)     All other components within normal limits   SALICYLATE LEVEL - Abnormal; Notable for the following components:    Salicylate Level <5.0 (*)     All other components within normal limits   CBC WITH DIFFERENTIAL - Abnormal; Notable for the following components:    MCV 78.7 (*)     MCHC 36.7 (*)     All other components within normal limits   TSH - Normal   ALCOHOL,MEDICAL (ETHANOL) - Normal   SARS-COV-2 RNA AMPLIFICATION, QUAL - Normal    Narrative:     The IDNOW COVID-19 assay is a rapid molecular in vitro diagnostic test utilizing an isothermal nucleic acid amplification technology intended for the qualitative detection of nucleic acid from the SARS-CoV-2 viral RNA in direct nasal, nasopharyngeal or throat swabs from individuals who are suspected of COVID-19 by their healthcare provider.   URINALYSIS, MICROSCOPIC - Normal   CBC W/ AUTO DIFFERENTIAL    Narrative:     The following orders were created for panel order CBC auto differential.  Procedure                               Abnormality         Status                     ---------                                -----------         ------                     CBC with Differential[[id number]]        Abnormal            Final result                 Please view results for these tests on the individual orders.          Imaging Results    None          Medications - No data to display  Medical Decision Making:   ED Management:  He has been medically cleared for placement                          Clinical Impression:   Final diagnoses:  [F32.A] Acute depression (Primary)  [R45.851] Suicidal ideation        ED Disposition Condition    Transfer to Psych Facility           ED Prescriptions    None       Follow-up Information    None          Dorothy Peralta MD  08/09/23 6375

## 2023-08-09 NOTE — FIRST PROVIDER EVALUATION
Medical screening examination initiated.  I have conducted a focused provider triage encounter, findings are as follows:    Brief history of present illness:  37 y/o male presents with SI/HI for couple days. Off medications for couple days because he doesn't have it. Plan to drive himself in traffic    There were no vitals filed for this visit.    Pertinent physical exam:  alert, nonlabored, ambulatory     Brief workup plan:  labs, urine    Preliminary workup initiated; this workup will be continued and followed by the physician or advanced practice provider that is assigned to the patient when roomed.

## 2023-08-10 PROBLEM — F33.3 MAJOR DEPRESSIVE DISORDER, RECURRENT, SEVERE WITH PSYCHOTIC FEATURES: Status: ACTIVE | Noted: 2023-08-10

## 2023-08-10 PROBLEM — Z72.0 TOBACCO ABUSE: Status: ACTIVE | Noted: 2023-08-10

## 2023-08-10 PROBLEM — F32.9 MAJOR DEPRESSIVE DISORDER, SINGLE EPISODE, UNSPECIFIED: Status: RESOLVED | Noted: 2022-08-11 | Resolved: 2023-08-10

## 2023-08-10 LAB
CHOLEST SERPL-MCNC: 332 MG/DL
CHOLEST/HDLC SERPL: 8 {RATIO} (ref 0–5)
GLUCOSE P FAST SERPL-MCNC: 110 MG/DL (ref 70–155)
HDLC SERPL-MCNC: 44 MG/DL (ref 35–60)
LDLC SERPL CALC-MCNC: 204 MG/DL (ref 50–140)
T PALLIDUM AB SER QL: REACTIVE
TRIGL SERPL-MCNC: 422 MG/DL (ref 34–140)
VLDLC SERPL CALC-MCNC: 84 MG/DL

## 2023-08-10 PROCEDURE — 25000003 PHARM REV CODE 250: Performed by: NURSE PRACTITIONER

## 2023-08-10 PROCEDURE — S4991 NICOTINE PATCH NONLEGEND: HCPCS | Performed by: NURSE PRACTITIONER

## 2023-08-10 PROCEDURE — 11400000 HC PSYCH PRIVATE ROOM

## 2023-08-10 PROCEDURE — 86592 SYPHILIS TEST NON-TREP QUAL: CPT | Performed by: PSYCHIATRY & NEUROLOGY

## 2023-08-10 PROCEDURE — 80061 LIPID PANEL: CPT | Performed by: PSYCHIATRY & NEUROLOGY

## 2023-08-10 PROCEDURE — 63600175 PHARM REV CODE 636 W HCPCS: Performed by: NURSE PRACTITIONER

## 2023-08-10 PROCEDURE — 82947 ASSAY GLUCOSE BLOOD QUANT: CPT | Performed by: PSYCHIATRY & NEUROLOGY

## 2023-08-10 PROCEDURE — 86780 TREPONEMA PALLIDUM: CPT | Performed by: PSYCHIATRY & NEUROLOGY

## 2023-08-10 PROCEDURE — 63600175 PHARM REV CODE 636 W HCPCS: Performed by: PSYCHIATRY & NEUROLOGY

## 2023-08-10 PROCEDURE — 25000003 PHARM REV CODE 250: Performed by: PSYCHIATRY & NEUROLOGY

## 2023-08-10 RX ORDER — PROPRANOLOL HYDROCHLORIDE 10 MG/1
10 TABLET ORAL 3 TIMES DAILY
Status: DISCONTINUED | OUTPATIENT
Start: 2023-08-10 | End: 2023-08-10

## 2023-08-10 RX ORDER — BUPRENORPHINE HYDROCHLORIDE AND NALOXONE HYDROCHLORIDE DIHYDRATE 2; .5 MG/1; MG/1
8 TABLET SUBLINGUAL 3 TIMES DAILY
Status: DISCONTINUED | OUTPATIENT
Start: 2023-08-10 | End: 2023-08-10

## 2023-08-10 RX ORDER — METOPROLOL SUCCINATE 25 MG/1
25 TABLET, EXTENDED RELEASE ORAL DAILY
Status: DISCONTINUED | OUTPATIENT
Start: 2023-08-10 | End: 2023-08-10

## 2023-08-10 RX ORDER — DIVALPROEX SODIUM 500 MG/1
500 TABLET, FILM COATED, EXTENDED RELEASE ORAL EVERY 12 HOURS
Status: DISCONTINUED | OUTPATIENT
Start: 2023-08-10 | End: 2023-08-16 | Stop reason: HOSPADM

## 2023-08-10 RX ORDER — HALOPERIDOL 5 MG/ML
5 INJECTION INTRAMUSCULAR ONCE
Status: COMPLETED | OUTPATIENT
Start: 2023-08-10 | End: 2023-08-10

## 2023-08-10 RX ORDER — ONDANSETRON 4 MG/1
8 TABLET, ORALLY DISINTEGRATING ORAL EVERY 8 HOURS PRN
Status: DISCONTINUED | OUTPATIENT
Start: 2023-08-10 | End: 2023-08-16 | Stop reason: HOSPADM

## 2023-08-10 RX ORDER — IBUPROFEN 200 MG
1 TABLET ORAL DAILY
Status: DISCONTINUED | OUTPATIENT
Start: 2023-08-10 | End: 2023-08-16 | Stop reason: HOSPADM

## 2023-08-10 RX ORDER — BUPRENORPHINE AND NALOXONE 8; 2 MG/1; MG/1
1 FILM, SOLUBLE BUCCAL; SUBLINGUAL 3 TIMES DAILY
Status: DISCONTINUED | OUTPATIENT
Start: 2023-08-10 | End: 2023-08-16 | Stop reason: HOSPADM

## 2023-08-10 RX ORDER — LORAZEPAM 0.5 MG/1
0.5 TABLET ORAL 2 TIMES DAILY
Status: COMPLETED | OUTPATIENT
Start: 2023-08-13 | End: 2023-08-13

## 2023-08-10 RX ORDER — LISINOPRIL 20 MG/1
20 TABLET ORAL DAILY
Status: DISCONTINUED | OUTPATIENT
Start: 2023-08-10 | End: 2023-08-16 | Stop reason: HOSPADM

## 2023-08-10 RX ORDER — PROPRANOLOL HYDROCHLORIDE 20 MG/1
20 TABLET ORAL 3 TIMES DAILY
Status: DISCONTINUED | OUTPATIENT
Start: 2023-08-10 | End: 2023-08-10

## 2023-08-10 RX ORDER — FAMOTIDINE 20 MG/1
40 TABLET, FILM COATED ORAL DAILY
Status: DISCONTINUED | OUTPATIENT
Start: 2023-08-10 | End: 2023-08-16 | Stop reason: HOSPADM

## 2023-08-10 RX ORDER — LORAZEPAM 0.5 MG/1
0.5 TABLET ORAL 3 TIMES DAILY
Status: COMPLETED | OUTPATIENT
Start: 2023-08-12 | End: 2023-08-12

## 2023-08-10 RX ORDER — LORAZEPAM 0.5 MG/1
1 TABLET ORAL 3 TIMES DAILY
Status: COMPLETED | OUTPATIENT
Start: 2023-08-11 | End: 2023-08-11

## 2023-08-10 RX ORDER — DIPHENHYDRAMINE HYDROCHLORIDE 50 MG/ML
50 INJECTION INTRAMUSCULAR; INTRAVENOUS ONCE
Status: COMPLETED | OUTPATIENT
Start: 2023-08-10 | End: 2023-08-10

## 2023-08-10 RX ORDER — QUETIAPINE 200 MG/1
200 TABLET, FILM COATED, EXTENDED RELEASE ORAL NIGHTLY
Status: DISCONTINUED | OUTPATIENT
Start: 2023-08-10 | End: 2023-08-11

## 2023-08-10 RX ORDER — LORAZEPAM 0.5 MG/1
0.5 TABLET ORAL DAILY
Status: COMPLETED | OUTPATIENT
Start: 2023-08-14 | End: 2023-08-14

## 2023-08-10 RX ORDER — AMLODIPINE BESYLATE 5 MG/1
5 TABLET ORAL DAILY
Status: DISCONTINUED | OUTPATIENT
Start: 2023-08-10 | End: 2023-08-16 | Stop reason: HOSPADM

## 2023-08-10 RX ORDER — IBUPROFEN 400 MG/1
800 TABLET ORAL EVERY 6 HOURS PRN
Status: DISCONTINUED | OUTPATIENT
Start: 2023-08-10 | End: 2023-08-16 | Stop reason: HOSPADM

## 2023-08-10 RX ORDER — LORAZEPAM 0.5 MG/1
1 TABLET ORAL 4 TIMES DAILY
Status: COMPLETED | OUTPATIENT
Start: 2023-08-10 | End: 2023-08-10

## 2023-08-10 RX ADMIN — DIVALPROEX SODIUM 500 MG: 500 TABLET, FILM COATED, EXTENDED RELEASE ORAL at 08:08

## 2023-08-10 RX ADMIN — DIPHENHYDRAMINE HYDROCHLORIDE 50 MG: 50 INJECTION, SOLUTION INTRAMUSCULAR; INTRAVENOUS at 01:08

## 2023-08-10 RX ADMIN — DIVALPROEX SODIUM 500 MG: 500 TABLET, FILM COATED, EXTENDED RELEASE ORAL at 10:08

## 2023-08-10 RX ADMIN — BUPRENORPHINE HYDROCHLORIDE, NALOXONE HYDROCHLORIDE 1 FILM: 8; 2 FILM, SOLUBLE BUCCAL; SUBLINGUAL at 03:08

## 2023-08-10 RX ADMIN — BUPRENORPHINE HYDROCHLORIDE, NALOXONE HYDROCHLORIDE 1 FILM: 8; 2 FILM, SOLUBLE BUCCAL; SUBLINGUAL at 08:08

## 2023-08-10 RX ADMIN — LORAZEPAM 1 MG: 0.5 TABLET ORAL at 08:08

## 2023-08-10 RX ADMIN — LISINOPRIL 20 MG: 20 TABLET ORAL at 10:08

## 2023-08-10 RX ADMIN — NICOTINE 1 PATCH: 21 PATCH, EXTENDED RELEASE TRANSDERMAL at 11:08

## 2023-08-10 RX ADMIN — LORAZEPAM 1 MG: 0.5 TABLET ORAL at 01:08

## 2023-08-10 RX ADMIN — METOPROLOL SUCCINATE 25 MG: 25 TABLET, EXTENDED RELEASE ORAL at 10:08

## 2023-08-10 RX ADMIN — LORAZEPAM 1 MG: 0.5 TABLET ORAL at 05:08

## 2023-08-10 RX ADMIN — MULTIPLE VITAMINS W/ MINERALS TAB 1 TABLET: TAB at 10:08

## 2023-08-10 RX ADMIN — LORAZEPAM 1 MG: 0.5 TABLET ORAL at 10:08

## 2023-08-10 RX ADMIN — BUPRENORPHINE HYDROCHLORIDE, NALOXONE HYDROCHLORIDE 1 FILM: 8; 2 FILM, SOLUBLE BUCCAL; SUBLINGUAL at 11:08

## 2023-08-10 RX ADMIN — FAMOTIDINE 40 MG: 20 TABLET, FILM COATED ORAL at 10:08

## 2023-08-10 RX ADMIN — ONDANSETRON 8 MG: 4 TABLET, ORALLY DISINTEGRATING ORAL at 11:08

## 2023-08-10 RX ADMIN — QUETIAPINE FUMARATE 200 MG: 200 TABLET, EXTENDED RELEASE ORAL at 08:08

## 2023-08-10 RX ADMIN — AMLODIPINE BESYLATE 5 MG: 5 TABLET ORAL at 08:08

## 2023-08-10 RX ADMIN — HALOPERIDOL LACTATE 5 MG: 5 INJECTION, SOLUTION INTRAMUSCULAR at 01:08

## 2023-08-10 RX ADMIN — IBUPROFEN 800 MG: 400 TABLET ORAL at 11:08

## 2023-08-10 NOTE — PROGRESS NOTES
Recreation Therapy Progress Note    Date: 8  10  2023     Time: 10:00 am     Group Title: creative expression     Mood: irritable     Behavior: anxious and pacing in and out of group . Pt refused to participate    Affect: anxious and irritable    Speech: pressured     Cognition: distracted thoughts     Participation Level: 0% pt refused group . Just paced in and out.     Intervention:cont rt services          Recreation Therapist   Signature: maryellen baig MA CTRS

## 2023-08-10 NOTE — NURSING
Patient is awake and alert on the unit.  Patient has a constricted affect and labile mood.  Patient admits to having anxiety, irritability and auditory hallucinations.  Patient admits to having racing thoughts and mood swings.  Patient is demanding and easily agitated.  Patient pacing the hallway and using profanity. No acute distress noted.  Will continue to monitor closely and provide emotional support.

## 2023-08-10 NOTE — SUBJECTIVE & OBJECTIVE
Past Medical History:   Diagnosis Date    Bipolar 1 disorder     Hypertension        No past surgical history on file.    Review of patient's allergies indicates:   Allergen Reactions    Buspirone     Codeine     Penicillins        Current Facility-Administered Medications on File Prior to Encounter   Medication    [COMPLETED] acetaminophen tablet 1,000 mg    [COMPLETED] LORazepam tablet 2 mg    [COMPLETED] ziprasidone injection 20 mg     Current Outpatient Medications on File Prior to Encounter   Medication Sig    amLODIPine (NORVASC) 5 MG tablet Take 1 tablet (5 mg total) by mouth once daily.    divalproex (DEPAKOTE) 250 MG EC tablet Take 1 tablet (250 mg total) by mouth 2 (two) times daily.    hydrOXYzine HCL (ATARAX) 25 MG tablet Take 1 tablet (25 mg total) by mouth 3 (three) times daily.    lisinopriL (PRINIVIL,ZESTRIL) 40 MG tablet Take 1 tablet (40 mg total) by mouth once daily.    pantoprazole (PROTONIX) 40 MG tablet Take 1 tablet (40 mg total) by mouth once daily.    QUEtiapine (SEROQUEL) 300 MG Tab Take 1 tablet (300 mg total) by mouth every evening.    QUEtiapine (SEROQUEL) 50 MG tablet Take 1 tablet (50 mg total) by mouth once daily.     Family History    None       Tobacco Use    Smoking status: Every Day     Current packs/day: 0.00     Types: Cigarettes    Smokeless tobacco: Not on file   Substance and Sexual Activity    Alcohol use: Not Currently    Drug use: Yes     Types: Methamphetamines     Comment: Methadone    Sexual activity: Not on file     Review of Systems   Constitutional: Negative.    HENT: Negative.     Eyes: Negative.    Respiratory: Negative.     Cardiovascular: Negative.    Gastrointestinal: Negative.    Endocrine: Negative.    Genitourinary:  Positive for difficulty urinating.   Musculoskeletal: Negative.    Skin: Negative.    Allergic/Immunologic: Negative.    Neurological: Negative.    Hematological: Negative.    Psychiatric/Behavioral:  Positive for agitation, behavioral problems,  hallucinations and suicidal ideas.      Objective:     Vital Signs (Most Recent):  Temp: 98.3 °F (36.8 °C) (08/10/23 0619)  Pulse: 71 (08/10/23 0619)  Resp: 20 (08/10/23 0619)  BP: 123/84 (08/10/23 0619)  SpO2: 95 % (08/10/23 0619) Vital Signs (24h Range):  Temp:  [98.1 °F (36.7 °C)-98.5 °F (36.9 °C)] 98.3 °F (36.8 °C)  Pulse:  [] 71  Resp:  [18-20] 20  SpO2:  [95 %-97 %] 95 %  BP: (123-157)/(84-94) 123/84     Weight: 86.9 kg (191 lb 9.3 oz)  Body mass index is 25.98 kg/m².     Physical Exam  Constitutional:       Appearance: Normal appearance. He is normal weight.   HENT:      Head: Normocephalic and atraumatic.      Nose: Nose normal.      Mouth/Throat:      Mouth: Mucous membranes are moist.      Pharynx: Oropharynx is clear.   Eyes:      Extraocular Movements: Extraocular movements intact and EOM normal.      Conjunctiva/sclera: Conjunctivae normal.      Pupils: Pupils are equal, round, and reactive to light.   Cardiovascular:      Rate and Rhythm: Normal rate and regular rhythm.      Pulses: Normal pulses.      Heart sounds: Normal heart sounds.   Pulmonary:      Effort: Pulmonary effort is normal.      Breath sounds: Normal breath sounds.   Abdominal:      General: Bowel sounds are normal.      Palpations: Abdomen is soft.   Musculoskeletal:         General: Normal range of motion.      Cervical back: Normal range of motion and neck supple.   Skin:     General: Skin is warm and dry.      Capillary Refill: Capillary refill takes 2 to 3 seconds.   Neurological:      General: No focal deficit present.      Mental Status: He is alert and oriented to person, place, and time. Mental status is at baseline.   Psychiatric:         Attention and Perception: He perceives auditory and visual hallucinations.         Mood and Affect: Mood is depressed.         Speech: Speech normal.         Behavior: Behavior is agitated.         Thought Content: Thought content includes suicidal ideation.         Cognition and  "Memory: Cognition and memory normal.         Judgment: Judgment is impulsive.         CRANIAL NERVES     CN I  cranial nerve I not tested    CN II   Visual fields full to confrontation.     CN III, IV, VI   Pupils are equal, round, and reactive to light.  Extraocular motions are normal.   CN III: no CN III palsy  CN VI: no CN VI palsy    CN V   Facial sensation intact.     CN VII   Facial expression full, symmetric.     CN VIII   CN VIII normal.     CN IX, X   CN IX normal.   CN X normal.     CN XI   CN XI normal.     CN XII   CN XII normal.         Significant Labs: All pertinent labs within the past 24 hours have been reviewed.  BMP:   Recent Labs   Lab 08/09/23  1040   *   K 3.9   CO2 21*   BUN 12.8   CREATININE 0.85   CALCIUM 9.9     CBC:   Recent Labs   Lab 08/09/23  1040   WBC 5.77   HGB 15.5   HCT 42.2        CMP:   Recent Labs   Lab 08/09/23  1040   *   K 3.9   CO2 21*   BUN 12.8   CREATININE 0.85   CALCIUM 9.9   ALBUMIN 4.6   BILITOT 1.4   ALKPHOS 69   AST 28   ALT 45     Magnesium: No results for input(s): "MG" in the last 48 hours.    Significant Imaging: I have reviewed all pertinent imaging results/findings within the past 24 hours.  "

## 2023-08-10 NOTE — PROGRESS NOTES
Recreation Therapy Assessment    1. MOOD: irritable     2. BEHAVIOR:isolated  and want to be alone  he states     3. AFFECT: depressed and withdrawn     4. COGNITION: alert  but distracted thoughts     5. MOTOR BEHAVIOR/SKILLS: ambulatory    6. SPEECH:normal     7. LEISURE FUNCTIONING: nothing pt states     8. LEISURE NEEDS: to regain quality of life with  leisure interest     9. PT EDUCATION LEVEL: highschool    10. WHAT IS THE BEST THING THAT EVER HAPPENED TO YOU? Pt refuse to answer    11. THINGS THAT FRUSTRATE AND ANGER ME ARE: I need help pt states    12. WHEN I GET ANGRY, I USUALLY: stay  to myself.     13. MY RELATIONSHIP WITH MOST PEOPLE ARE: not good. I stay to myself.     14. LEISURE INTERESTS/SKILLS: nothing lately but use to enjoy music and sports.     15. LEISURE BARRIERS: depressed and anxiety .  Pt has decline in daily living skills . Pt has a hx of SAD disorder and bipolar     16. ASSESSMENT SUMMARY: pt is a 38 year old male . Pt  has decline in all daily living skills . Pt not taking all his medicine .       17. TX PLAN FOR RECREATION THERAPY: pt will receive rt services  2 x a day and 5 x a week with Evangelina JIMENEZS . Pt will be assisted to complete 3 to 4 assignments on problem solving skills and emotional outlets to enhance coping skills . Pt will be assited to complete 3 to 4 assignments on leisure skills ,interests and participation of his interests . Pt will utilize art ,music ,cognitive games and exercise to achieve these goals. Assist pt 1;1 as needed   To achieve these goals.     18. SIGNATURE/CREDENTIALS:  Evangelina baig MA CTRS                                                                    DATE:                              TIME:        RECREATION THERAPIST  JEANETTE

## 2023-08-10 NOTE — NURSING
Called hospitalist about BP 93/65, P 71. Reviewed meds. New orders noted. Education provided to patient. Verbalized understanding.

## 2023-08-10 NOTE — HPI
37 yo male admitted to [facility] for suicidal ideations.  He has h/o chronic pain and Bipolar.  He is not taking his meds for unknown reason.  He does abuse Etoh and drugs.  He states some urinary retention since receiving some meds in the ED.  He denies any N/V/D/C.  No fever/chills.   No chest pain/SOB.  Hospitalist have been consulted for medical evaluation.  He does state h/o HTN.

## 2023-08-10 NOTE — PROGRESS NOTES
Recreation Therapy Progress Note    Date: 8  10  2023     Time: 1400 group session     Group Title: leisure skills     Mood: anxious and irritable     Behavior: oppositional and irritable                      Pt refused group when asked.   Affect: anxious     Speech: irritable.     Cognition: alert  ,pt fussing down the [place]ompson  and cursing earlier. Pt pacing the halls and very irritable . Pt refused afternoon group .    Participation Level : 0%   Intervention:cont rt services          Recreation Therapist   Signature: Evangelina Thapa MA CTRS

## 2023-08-10 NOTE — GROUP NOTE
Group Psychotherapy       Group Focus: Psychodynamic Group Psychotherapy      Number of patients in attendance: 2    Group Start Time: 0830  Group End Time:  0915  Groups Date: 8/10/2023  Group Topic:  Behavioral Health  Group Department: [facility]  Group Facilitators:  Talon Christopher LCSW  _____________________________________________________________________    Patient Name: Keshav Godfrey  MRN: [id number]  Patient Class: IP- Psych   Admission Date\Time: 8/9/2023  7:11 PM  Hospital Length of Stay: 1  Primary Care Provider: Yifan Rodarte MD     Referred by: Behavioral Medicine Unit Treatment Team     Target symptoms: Substance Abuse     Patient's response to treatment: Not Participating     Progress toward goals: Not progressing     Interval History: Pt did not attend group     Diagnosis:      Plan: Continue treatment on BMU

## 2023-08-10 NOTE — PSYCH EVALUATION
Behavioral Health Unit  Psychosocial History and Assessment  Progress Note      Patient Name: Keshav Godfrey YOB: 1985 SW: Talon Christopher, LCSW Date: 8/10/2023    Chief Complaint: addictive disorder    Consent:     Did the patient consent for an interview with the ? Yes    Did the patient consent for the  to contact family/friend/caregiver?   Yes  Relationship: sister    Did the patient give consent for the  to inform family/friend/caregiver of his/her whereabouts or to discuss discharge planning? Yes    Source of Information: Face to face with patient and Chart review    Is information obtained from interviews considered reliable?   yes    Reason for Admission:     Active Hospital Problems    Diagnosis  POA    *Major depressive disorder, recurrent, severe with psychotic features [F33.3]  Yes      Resolved Hospital Problems   No resolved problems to display.       History of Present Illness - (Patient Perception):   Pt went to ER with SI but denies that now.  Pt states he had been sober but then came here from the [facility] and relapsed on amphetamines.  Pt denies any significant connor of alcohol.  Pt has a long history of subtsnace abuse and has been in psych hospitals and rehabs in the past.  Pt is requesting referal to rehab on this occasion.    History of Present Illness - (Perception of Others): years according to patient    Present biopsychosocial functioning: low    Past biopsychosocial functioning: Pt has ahsitory of higher function    Family and Marital/Relationship History:     Significant Other/Partner Relationships:  Single:  No children    Family Relationships: Intact      Childhood History:     Where was patient raised? [place]    Who raised the patient? parents      How does patient describe their childhood? good      Who is patient's primary support person? sister      Culture and Muslim:     Muslim: Congregational    How strong of a role  does Rastafarian and spirituality play in patient's life? none    Synagogue or spiritual concerns regarding treatment: not applicable     History of Abuse:   History of Abuse: Denies      Outcome:     Psychiatric and Medical History:     History of psychiatric illness or treatment: prior inpatient treatment and has participated in counseling/psychotherapy on an outpatient basis in the past    Medical history:   Past Medical History:   Diagnosis Date    Bipolar 1 disorder     Hypertension        Substance Abuse History:     Alcohol - (Patient Perspective):   Social History     Substance and Sexual Activity   Alcohol Use Not Currently       Alcohol - (Collateral Perspective): monthly according to patient    Drugs - (Patient Perspective):   Social History     Substance and Sexual Activity   Drug Use Yes    Types: Methamphetamines    Comment: Methadone       Drugs - (Collateral Perspective): meth daily according to patient    Additional Comments: Pt is currently on suboxone treatment    Education:     Currently Enrolled? No  High School (9-12) or GED    Special Education? No    Interested in Completing Education/GED: No    Employment and Financial:     Currently employed? Unemployed due to drug use    Source of Income:  family assists him    Able to afford basic needs (food, shelter, utilities)? No    Who manages finances/personal affairs? patient      Service:     ? no    Combat Service? No     Community Resources:     Describe present use of community resources: none     Identify previously used community resources   (Include previous mental health treatment - outpatient and inpatient): Pt has ahistory of rehabs and psych admits due to drug use.    Environmental:     Current living situation:Lives alone    Social Evaluation:     Patient Assets: General fund of knowledge, Supportive family/friends, Capable of independent living, and Work skills    Patient Limitations: poor coping skills, drug use    High  risk psychosocial issues that may impact discharge planning:   Homeless  drug use    Recommendations:     Anticipated discharge plan:   Pt is requesting rehab    High risk issues requiring early treatment planning and immediate intervention: potential for relapse    Community resources needed for discharge planning:  aftercare treatment sources    Anticipated social work role(s) in treatment and discharge planning:  will offer advice and  as well as group therapy 4x per week and individual as needed.   will assist with discharge planning and referrals to aftercare resources.

## 2023-08-10 NOTE — NURSING
"" I'm nauseated fuck, I need something" Administered Zofran 8 mg so Very demanding and labile  1225" I'm good. " Tolerated lunch 100 %  "

## 2023-08-10 NOTE — NURSING
"Admission Note:    Kesahv Godfrey is a 38 y.o. male, : 1985, MRN: [id number], admitted on 2023 to [facility] ([facility]) for Mauricio Funk MD with a diagnosis of Schizophrenia [F20.9]. Patient admitted on a status of Physician Emergency Certificate (PEC). Keshav Decker reports the following allergies:. Buspirone, Codeine, Penicillin    Patient demonstrated an affect that was anxious, irritable, agitated, expansive, and  labile. Patient demonstrated mood during assessment that was anxious and swings. Patient had an appearance that was disheveled.  Patient denies suicidal ideation. Patient denies suicide plan. Patient denies hallucinations.   He would pace in intake room and when sitting his arms and legs would constantly move. He was disoriented and could not perform anxiety and depression scale. Attempted MMSE but could not complete. "I cant think striaght. I just want to go to bed."  He received Geodon and ativan injection PTA on this unit.UDS positive for amphetamines. States last meth use was "sometime this morning." He denies SI and HI upon arrival to unit. Spoke to sister Sydney, he has a long hx of substance abuse and mother unable him. Last IP was [place] in [facility], thin to a custodial house.  Came in with Suboxone strips.     Keshav Decker's  height is 6' (1.829 m) and weight is 86.9 kg (191 lb 9.3 oz). His temperature is 98.5 °F (36.9 °C). His blood pressure is 157/90 (abnormal) and his pulse is 108. His respiration is 20 and oxygen saturation is 97%.     Keshav Samaniegos last BM was noted on: unknown by patient    Metal detector screening performed via security personnel. The result of the scan was negative. Head-to-toe physical assessment completed with the following findings:  Tattoos to right arm, center back, neck, chest, left forearm, under left ear and mole to right upper eyebrow found upon body screen. A full skin assessment was performed. Keshav Samaniegos skin " appeared WNL. Keshav Decker was oriented to unit, staff, peers, and room. Patient belongings/valuables stored in locked intake room cabinet and changes of clothing provided to patient. Keshav Decker was placed on Q 15 min observations.

## 2023-08-10 NOTE — CONSULTS
[facility] Hospital Medicine  Consult Note    Patient Name: Keshav Godfrey  MRN: [id number]  Admission Date: 8/9/2023  Hospital Length of Stay: 1 days  Attending Physician: Mauricio Funk MD   Primary Care Provider: Yifan Rodarte MD           Patient information was obtained from patient and ER records.     Consults  Subjective:     Principal Problem: Major depressive disorder, recurrent, severe with psychotic features    Chief Complaint: suicidal.       HPI: 37 yo male admitted to [facility] for suicidal ideations.  He has h/o chronic pain and Bipolar.  He is not taking his meds for unknown reason.  He does abuse Etoh and drugs.  He states some urinary retention since receiving some meds in the ED.  He denies any N/V/D/C.  No fever/chills.   No chest pain/SOB.  Hospitalist have been consulted for medical evaluation.  He does state h/o HTN.      Past Medical History:   Diagnosis Date    Bipolar 1 disorder     Hypertension        Past surgical history on file:  Inguinal hernia    Review of patient's allergies indicates:   Allergen Reactions    Buspirone     Codeine     Penicillins        Current Facility-Administered Medications on File Prior to Encounter   Medication    [COMPLETED] acetaminophen tablet 1,000 mg    [COMPLETED] LORazepam tablet 2 mg    [COMPLETED] ziprasidone injection 20 mg     Current Outpatient Medications on File Prior to Encounter   Medication Sig    amLODIPine (NORVASC) 5 MG tablet Take 1 tablet (5 mg total) by mouth once daily.    divalproex (DEPAKOTE) 250 MG EC tablet Take 1 tablet (250 mg total) by mouth 2 (two) times daily.    hydrOXYzine HCL (ATARAX) 25 MG tablet Take 1 tablet (25 mg total) by mouth 3 (three) times daily.    lisinopriL (PRINIVIL,ZESTRIL) 40 MG tablet Take 1 tablet (40 mg total) by mouth once daily.    pantoprazole (PROTONIX) 40 MG tablet Take 1 tablet (40 mg total) by mouth once daily.    QUEtiapine (SEROQUEL) 300 MG Tab Take  1 tablet (300 mg total) by mouth every evening.    QUEtiapine (SEROQUEL) 50 MG tablet Take 1 tablet (50 mg total) by mouth once daily.     Family History    Mother-cancer  Father-HTN       Tobacco Use    Smoking status: Every Day     Current packs/day: 0.00     Types: Cigarettes    Smokeless tobacco: Not on file   Substance and Sexual Activity    Alcohol use: Not Currently    Drug use: Yes     Types: Methamphetamines     Comment: Methadone    Sexual activity: Not on file     Review of Systems   Constitutional: Negative.    HENT: Negative.     Eyes: Negative.    Respiratory: Negative.     Cardiovascular: Negative.    Gastrointestinal: Negative.    Endocrine: Negative.    Genitourinary:  Positive for difficulty urinating.   Musculoskeletal: Negative.    Skin: Negative.    Allergic/Immunologic: Negative.    Neurological: Negative.    Hematological: Negative.    Psychiatric/Behavioral:  Positive for agitation, behavioral problems, hallucinations and suicidal ideas.      Objective:     Vital Signs (Most Recent):  Temp: 98.3 °F (36.8 °C) (08/10/23 0619)  Pulse: 71 (08/10/23 0619)  Resp: 20 (08/10/23 0619)  BP: 123/84 (08/10/23 0619)  SpO2: 95 % (08/10/23 0619) Vital Signs (24h Range):  Temp:  [98.1 °F (36.7 °C)-98.5 °F (36.9 °C)] 98.3 °F (36.8 °C)  Pulse:  [] 71  Resp:  [18-20] 20  SpO2:  [95 %-97 %] 95 %  BP: (123-157)/(84-94) 123/84     Weight: 86.9 kg (191 lb 9.3 oz)  Body mass index is 25.98 kg/m².     Physical Exam  Constitutional:       Appearance: Normal appearance. He is normal weight.   HENT:      Head: Normocephalic and atraumatic.      Nose: Nose normal.      Mouth/Throat:      Mouth: Mucous membranes are moist.      Pharynx: Oropharynx is clear.   Eyes:      Extraocular Movements: Extraocular movements intact and EOM normal.      Conjunctiva/sclera: Conjunctivae normal.      Pupils: Pupils are equal, round, and reactive to light.   Cardiovascular:      Rate and Rhythm: Normal rate and regular  rhythm.      Pulses: Normal pulses.      Heart sounds: Normal heart sounds.   Pulmonary:      Effort: Pulmonary effort is normal.      Breath sounds: Normal breath sounds.   Abdominal:      General: Bowel sounds are normal.      Palpations: Abdomen is soft.   Musculoskeletal:         General: Normal range of motion.      Cervical back: Normal range of motion and neck supple.   Skin:     General: Skin is warm and dry.      Capillary Refill: Capillary refill takes 2 to 3 seconds.   Neurological:      General: No focal deficit present.      Mental Status: He is alert and oriented to person, place, and time. Mental status is at baseline.   Psychiatric:         Attention and Perception: He perceives auditory and visual hallucinations.         Mood and Affect: Mood is depressed.         Speech: Speech normal.         Behavior: Behavior is agitated.         Thought Content: Thought content includes suicidal ideation.         Cognition and Memory: Cognition and memory normal.         Judgment: Judgment is impulsive.         CRANIAL NERVES     CN I  cranial nerve I not tested    CN II   Visual fields full to confrontation.     CN III, IV, VI   Pupils are equal, round, and reactive to light.  Extraocular motions are normal.   CN III: no CN III palsy  CN VI: no CN VI palsy    CN V   Facial sensation intact.     CN VII   Facial expression full, symmetric.     CN VIII   CN VIII normal.     CN IX, X   CN IX normal.   CN X normal.     CN XI   CN XI normal.     CN XII   CN XII normal.         Significant Labs: All pertinent labs within the past 24 hours have been reviewed.  BMP:   Recent Labs   Lab 08/09/23  1040   *   K 3.9   CO2 21*   BUN 12.8   CREATININE 0.85   CALCIUM 9.9     CBC:   Recent Labs   Lab 08/09/23  1040   WBC 5.77   HGB 15.5   HCT 42.2        CMP:   Recent Labs   Lab 08/09/23  1040   *   K 3.9   CO2 21*   BUN 12.8   CREATININE 0.85   CALCIUM 9.9   ALBUMIN 4.6   BILITOT 1.4   ALKPHOS 69   AST 28  "  ALT 45     Magnesium: No results for input(s): "MG" in the last 48 hours.    Significant Imaging: I have reviewed all pertinent imaging results/findings within the past 24 hours.    Assessment/Plan:     * Major depressive disorder, recurrent, severe with psychotic features  Patient has recurrent depression which is severe and is currently uncontrolled. Will Continue anti-depressant medications. We will consult psychiatry at this time. Patient does display psychosis at this time. Continue to monitor closely and adjust plan of care as needed.    Admit to [facility]  OOB  Ambulate  Review meds  Monitor urinary output    Tobacco abuse  Advise to stop.  Can offer nicotine patch.  He is not interested in stopping(cessation=4mins)      Methamphetamine use  Advised to stop      Essential hypertension  Review meds  monitor        VTE Risk Mitigation (From admission, onward)    None              Thank you for your consult. I will sign off. Please contact us if you have any additional questions.    Gerald Dominguez MD  Department of Hospital Medicine   [facility]    "

## 2023-08-10 NOTE — NURSING
"" I need my ibuprofen 800 mg for my back. I had 2 surgeries it about 7"    1237 No further c/o of back pain. Pacing in room . Still irritable and demanding.     " I did pee a little bit.' No complaints a bladder pain.    "
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
## 2023-08-10 NOTE — ASSESSMENT & PLAN NOTE
Patient has recurrent depression which is severe and is currently uncontrolled. Will Continue anti-depressant medications. We will consult psychiatry at this time. Patient does display psychosis at this time. Continue to monitor closely and adjust plan of care as needed.    Admit to [facility]  OOB  Ambulate  Review meds  Monitor urinary output

## 2023-08-10 NOTE — HOSPITAL COURSE
8/10/23-Patient is resting in the bed.  He is stating urinary retention.  If he does not void in the next couple hours will bladder scan and reevaluate.

## 2023-08-10 NOTE — GROUP NOTE
Education group      Group Focus: Offered group on medications, side effects, safety and importance of compliance.      Number of patients in attendance: 2    Group Start Time: 0915  Group End Time:  1000  Groups Date: 8/10/2023  Group Topic:  Behavioral Health  Group Department: [facility]  Group Facilitators:  Alicja Mehta LPN  _____________________________________________________________________    Patient Name: Keshav Godfrey  MRN: [id number]  Patient Class: IP- Psych   Admission Date\Time: 8/9/2023  7:11 PM  Hospital Length of Stay: 1  Primary Care Provider: Yifan Rodarte MD     Referred by: Behavioral Medicine Unit Treatment Team     Target symptoms: Depression     Patient's response to treatment: did not attend     Progress toward goals:      Interval History:      Diagnosis:      Plan: Continue treatment on BMU

## 2023-08-10 NOTE — PLAN OF CARE
Initiated Plan of Care:    Problem: Adult Inpatient Plan of Care  Goal: Plan of Care Review  Outcome: Ongoing, Progressing  Flowsheets (Taken 8/10/2023 0301)  Plan of Care Reviewed With:   patient   sibling     Problem: Adult Inpatient Plan of Care  Goal: Patient-Specific Goal (Individualized)  Outcome: Ongoing, Progressing  Flowsheets (Taken 8/10/2023 0301)  Anxieties, Fears or Concerns: Poor impulse control, Substance abuse  Individualized Care Needs: coping skills     Problem: Adult Inpatient Plan of Care  Goal: Absence of Hospital-Acquired Illness or Injury  Outcome: Ongoing, Progressing     Problem: Adult Inpatient Plan of Care  Goal: Optimal Comfort and Wellbeing  Outcome: Ongoing, Progressing     Problem: Adult Inpatient Plan of Care  Goal: Readiness for Transition of Care  Outcome: Ongoing, Progressing     Problem: Violence Risk or Actual  Goal: Anger and Impulse Control  Outcome: Ongoing, Progressing     Problem: Suicide Risk  Goal: Absence of Self-Harm  Intervention: Assess Risk to Self and Maintain Safety  Flowsheets (Taken 8/10/2023 0305)  Behavior Management: impulse control promoted     Problem: Suicide Risk  Goal: Absence of Self-Harm  Intervention: Assess Risk to Self and Maintain Safety  Flowsheets (Taken 8/10/2023 0305)  Behavior Management: impulse control promoted     Problem: Social, Occupational or Functional Impairment (Excessive Substance Use)  Goal: Enhanced Social, Occupational or Functional Skills (Excessive Substance Use)  Flowsheets (Taken 8/10/2023 0306)  Mutually Determined Action Steps (Enhanced Social, Occupational or Functional Skills):   participates in social skills training   identifies personal strengths   identifies support resources

## 2023-08-11 PROBLEM — F31.64 BIPOLAR AFFECTIVE DISORDER, MIXED, SEVERE, WITH PSYCHOTIC BEHAVIOR: Status: ACTIVE | Noted: 2023-08-10

## 2023-08-11 PROBLEM — F10.20 ALCOHOL DEPENDENCE: Status: ACTIVE | Noted: 2023-08-11

## 2023-08-11 LAB
RPR SER QL: NORMAL
RPR SER-TITR: NORMAL {TITER}

## 2023-08-11 PROCEDURE — 25000003 PHARM REV CODE 250: Performed by: NURSE PRACTITIONER

## 2023-08-11 PROCEDURE — 25000003 PHARM REV CODE 250: Performed by: PSYCHIATRY & NEUROLOGY

## 2023-08-11 PROCEDURE — 86780 TREPONEMA PALLIDUM: CPT | Performed by: PSYCHIATRY & NEUROLOGY

## 2023-08-11 PROCEDURE — 63600175 PHARM REV CODE 636 W HCPCS: Performed by: NURSE PRACTITIONER

## 2023-08-11 PROCEDURE — S4991 NICOTINE PATCH NONLEGEND: HCPCS | Performed by: NURSE PRACTITIONER

## 2023-08-11 PROCEDURE — 11400000 HC PSYCH PRIVATE ROOM

## 2023-08-11 RX ORDER — QUETIAPINE 200 MG/1
400 TABLET, FILM COATED, EXTENDED RELEASE ORAL NIGHTLY
Status: DISCONTINUED | OUTPATIENT
Start: 2023-08-11 | End: 2023-08-16 | Stop reason: HOSPADM

## 2023-08-11 RX ADMIN — QUETIAPINE FUMARATE 400 MG: 200 TABLET, EXTENDED RELEASE ORAL at 08:08

## 2023-08-11 RX ADMIN — HYDROXYZINE PAMOATE 50 MG: 25 CAPSULE ORAL at 05:08

## 2023-08-11 RX ADMIN — BUPRENORPHINE HYDROCHLORIDE, NALOXONE HYDROCHLORIDE 1 FILM: 8; 2 FILM, SOLUBLE BUCCAL; SUBLINGUAL at 08:08

## 2023-08-11 RX ADMIN — IBUPROFEN 800 MG: 400 TABLET ORAL at 01:08

## 2023-08-11 RX ADMIN — BUPRENORPHINE HYDROCHLORIDE, NALOXONE HYDROCHLORIDE 1 FILM: 8; 2 FILM, SOLUBLE BUCCAL; SUBLINGUAL at 02:08

## 2023-08-11 RX ADMIN — LORAZEPAM 1 MG: 0.5 TABLET ORAL at 08:08

## 2023-08-11 RX ADMIN — IBUPROFEN 800 MG: 400 TABLET ORAL at 08:08

## 2023-08-11 RX ADMIN — LORAZEPAM 1 MG: 0.5 TABLET ORAL at 02:08

## 2023-08-11 RX ADMIN — DIVALPROEX SODIUM 500 MG: 500 TABLET, FILM COATED, EXTENDED RELEASE ORAL at 08:08

## 2023-08-11 RX ADMIN — DIVALPROEX SODIUM 500 MG: 500 TABLET, FILM COATED, EXTENDED RELEASE ORAL at 09:08

## 2023-08-11 RX ADMIN — LISINOPRIL 20 MG: 20 TABLET ORAL at 08:08

## 2023-08-11 RX ADMIN — ONDANSETRON 8 MG: 4 TABLET, ORALLY DISINTEGRATING ORAL at 02:08

## 2023-08-11 RX ADMIN — MULTIPLE VITAMINS W/ MINERALS TAB 1 TABLET: TAB at 08:08

## 2023-08-11 RX ADMIN — AMLODIPINE BESYLATE 5 MG: 5 TABLET ORAL at 08:08

## 2023-08-11 RX ADMIN — FAMOTIDINE 40 MG: 20 TABLET, FILM COATED ORAL at 08:08

## 2023-08-11 RX ADMIN — NICOTINE 1 PATCH: 21 PATCH, EXTENDED RELEASE TRANSDERMAL at 08:08

## 2023-08-11 NOTE — PROGRESS NOTES
Recreation Therapy Progress Note    Date: 8 11 2023     Time: 10;00 am group    Group Title: creative expression    Mood: irritable     Behavior: pt refused groups    Affect: anxious and irritable    Speech: normal    Cognition: alert when talk to and coherent     Participation Level: 0% pt refused group    Intervention:cont to offer rt services.           Recreation Therapist   Signature: maryellen baig MA CTRS

## 2023-08-11 NOTE — PROGRESS NOTES
Recreation Therapy Progress Note    Date: 8 11 2023     Time: 1400 hours    Group Title: leisure skills     Mood: anxious but more calm    Behavior: cooperative     Affect: anxious     Speech: normal    Cognition: alert came to group for a little while and listen to music then left and went back to bed.     Participation Level: pt.  did 15%  in group,then went back to bed.  Intervention:cont to offer rt services          Recreation Therapist   Signature: Evangelina Thapa MA CTRS

## 2023-08-11 NOTE — CARE UPDATE
Keshav had an interview today 8/11/23 @ 1355pm with [facility] out of [place]. [facility] will be calling back with a decision.

## 2023-08-11 NOTE — HOSPITAL COURSE
"The patient was admitted to the psychiatric unit and monitored for safety. The medications were reconciled. A history and physical was completed by the primary care doctor and medical issues were addressed. The patient was provided with individual and group therapy. On admit, pt was restarted on home med regimen including Depakote, Seroquel, Suboxone and started on Ativan for detox sx.     8/11/23-Staff reports pt was agitated and nonredirectable on 8/10, given Haldol and benadryl which was effective. For interview today, pt is guarded with irritable affect and demeanor. His responses are juan and superficial, lacking in much detail. His primary complaint today is "I just want to sleep." He reports mood is anxious and depressed. He continues to c/o AH - "Its like these voices telling me something bad happened to my family." States at times, he believes what the voices are telling him suggestive of mild underlying paranoia, though TP is  inear and he is no preoccupied with delusional TC. Reports sleep has been fair. He denies any s/e of current med regimen. Denies current SI/HI/plan/intent. His vitals are wnl and he does not appear tremulous, no objective s/s of etoh w/d. Agrees with increasing seroquel for mood and AH. He is interested in residential rehab - has phone interview with [facility] rehab today.     8/14/23-He has not been going to group and has been very isolative. He has a flat affect and poor eye contact. His appetite is poor and he is slowly improving with hydration. He appears helpless, hopeless, and apathetic. He has not spoken with his parents since his admission here and stated, "I can't even call them; they made that clear". He has spoken with his sister. He verbalized being "extremely depressed". We discussed starting medications and he did agree. I will try cymbalta as he has a lot of body pain as well as depression. He is motivated to continue with his recovery and is not opposed to returning to " rehab.     8/15/23-Seen for treatment team. He has been taking meds but was upset this morning and refused them. He was planning to go to rehab from here but now says he has to go home after discharge. He says he will go to AA meetings and may go to rehab in a few days. He has been less labile and less irritable overall. Mood improved. No si/hi and no psychosis currently. Sleep disrupted and eating ok. Tolerating meds. He is likely at baseline and will plan for discharge home tomorrow. Says he can go to sister's house and his car is there. He plans to return home to [place] afterwards.

## 2023-08-11 NOTE — HPI
"Keshav Godfrey is a 38 y.o. male placed under a PEC at [facility] for suicidal ideations with a plan to drive himself into traffic. He also reported both auditory and visual hallucinations as well as medication noncompliance. He was irritable and agitated on presentation and stated that he felt "horrible". He reports that he was living in a sober living house for the past 10 months and has been clean and sober. His family started talking to him again and also helped him to get a vehicle. After 10 months he left the sober living house and returned to his apartment. "They were so mad at me, they refused to even speak to me". He lasted two weeks and then relapsed. "I felt like I was losing my mind. I was hearing voices all the time and I just can't deal with it". He admits to drinking a half gallon a day for the past few weeks. He also did methamphetamines for the first time in a few weeks. He had stayed compliant with his medications as well as his suboxone despite the relapse. He also reports that he has never had hallucinations but that they started several months ago and has never stopped. He has struggled with methamphetamines and opioids his entire life and only recently started drinking. He endorses depression, anger, anxiety, and remorse over the relapse. He is very angry with himself over it. He has had a few episodes of impulsive and inappropriate behavior on the unit thus far. We did discuss the consequences of this if it continues.   "

## 2023-08-11 NOTE — PROGRESS NOTES
"2023 12:34 PM   Name: Keshav Godfrey   : 1985   MRN: [id number]   [facility] Progress Note     SUBJECTIVE:   Pt seen and chart reviewed, received update from staff. Pt is new to me, received clinical update from staff and reviewed notes by psych APRN. Briefly, pt admitted for worsening mood/irritablity and AVH in the setting of meth use. Staff reports pt was agitated and nonredirectable on 8/10, given Haldol and benadryl which was effective. For interview today, pt is guarded with irritable affect and demeanor. His responses are juan and superficial, lacking in much detail. His primary complaint today is "I just want to sleep." He reports mood is anxious and depressed. He continues to c/o AH - "Its like these voices telling me something bad happened to my family." States at times, he believes what the voices are telling him suggestive of mild underlying paranoia, though TP is  inear and he is no preoccupied with delusional TC. Reports sleep has been fair. He denies any s/e of current med regimen. Denies current SI/HI/plan/intent. His vitals are wnl and he does not appear tremulous, no objective s/s of etoh w/d. Agrees with increasing seroquel for mood and AH. He is interested in residential rehab - has phone interview with [facility]hab today.        Current Medications:   Scheduled Meds:    amLODIPine  5 mg Oral Daily    buprenorphine-naloxone 8-2 mg  1 Film Sublingual TID    divalproex ER  500 mg Oral Q12H    famotidine  40 mg Oral Daily    lisinopriL  20 mg Oral Daily    [START ON 2023] LORazepam  0.5 mg Oral TID    [START ON 2023] LORazepam  0.5 mg Oral BID    [START ON 2023] LORazepam  0.5 mg Oral Daily    LORazepam  1 mg Oral TID    multivitamin  1 tablet Oral Daily    nicotine  1 patch Transdermal Daily    QUEtiapine  200 mg Oral QHS      PRN Meds: acetaminophen, hydrOXYzine pamoate, ibuprofen, ondansetron     Allergies:   Review of patient's allergies indicates:   Allergen Reactions " "   Buspirone     Codeine     Penicillins         OBJECTIVE:   Vitals   Vitals:    08/11/23 0838   BP: 118/75   Pulse:    Resp:    Temp:         Mental Status Exam:  Appearance: fair hygiene/grooming  Sensorium: alert  Orientation: oriented to person, place, and time  Behavior/Cooperation: guarded and restless  Movements/Motor Activity: No tremor or involuntary movements observed. No psychomotor retardation or agitation  Speech:  juan responses, irritable tone  Affect: irritable  Mood: anxious, depressed, irritable  Thought Process:  linear today  Associations: no loosening of associations  Thought Content: denies SI/HI/plan/intent and mild paranoia at times  Thought Perceptions: +AH, denies VH, no RIS observed  Attention/Concentration: Impaired but improving  Memory: recent and remote grossly intact  Insight: limited  Judgment: limited    Recent Results (from the past 48 hour(s))   SYPHILIS ANTIBODY (WITH REFLEX RPR)    Collection Time: 08/10/23  5:24 AM   Result Value Ref Range    Syphilis Antibody Reactive (A) Nonreactive, Equivocal   Lipid Panel    Collection Time: 08/10/23  5:24 AM   Result Value Ref Range    Cholesterol Total 332 (H) <=200 mg/dL    HDL Cholesterol 44 35 - 60 mg/dL    Triglyceride 422 (H) 34 - 140 mg/dL    Cholesterol/HDL Ratio 8 (H) 0 - 5    Very Low Density Lipoprotein 84     LDL Cholesterol 204.00 (H) 50.00 - 140.00 mg/dL   Glucose, Fasting    Collection Time: 08/10/23  5:24 AM   Result Value Ref Range    Glucose Fasting 110 70 - 155 mg/dL   RPR    Collection Time: 08/10/23  5:24 AM   Result Value Ref Range    RPR Non-Reactive Non-Reactive    RPR Titer        No results found for: "PHENYTOIN", "PHENOBARB", "VALPROATE", "CBMZ"      ASSESSMENT/PLAN:   Diagnosis:  Active Hospital Problems    Diagnosis  POA    *Bipolar affective disorder, mixed, severe, with psychotic behavior [F31.64]  Yes    Alcohol dependence [F10.20]  Unknown    Tobacco abuse [Z72.0]  Yes    Methamphetamine use [F15.10]  Yes "    Essential hypertension [I10]  Yes     Chronic      Resolved Hospital Problems   No resolved problems to display.       Plan:  - Increase Seroquel XR to 400mg PO qhs for mood stabilization and psychotic sx  - Cont ativan taper for etoh w/d PPX, tolerating well so far, no objective s/s of etoh w/d currently  - Cont other meds as Rx, tolerating well  - Phone interview with rehab today, likely discharge early next week if accepted    Expected Disposition Plan: Substance treatment program      Saajn Martinez MD  Psychiatry - [facility]  08/11/2023 12:47 PM

## 2023-08-11 NOTE — NURSING
"Patient presented with complaints of dysuria stating that he "has to strain for his urine to come out". He asked for medication to relieve this but was educated on not being able to administer any medications without a provider's order. Patient reports informing doctor of this complaint during rounds earlier in the day. Patient went to room with no further complaints at this time.   "

## 2023-08-11 NOTE — NURSING
"Keshav is awake alert and oriented. Affect is blunted and constricted. He endorses, AH " I always here voices, something bad happened to my family". Had phone interview with [facility] today. Denies SI or HI. He's on Suboxone tid, denies side effects. Met with Dr. Martinez, Seroquel increased to 400 mg qhs. Will continue to monitor mood and behavior and offer emotional support.   "

## 2023-08-12 PROCEDURE — 25000003 PHARM REV CODE 250: Performed by: NURSE PRACTITIONER

## 2023-08-12 PROCEDURE — 63600175 PHARM REV CODE 636 W HCPCS: Performed by: NURSE PRACTITIONER

## 2023-08-12 PROCEDURE — S4991 NICOTINE PATCH NONLEGEND: HCPCS | Performed by: NURSE PRACTITIONER

## 2023-08-12 PROCEDURE — 25000003 PHARM REV CODE 250: Performed by: PSYCHIATRY & NEUROLOGY

## 2023-08-12 PROCEDURE — 11400000 HC PSYCH PRIVATE ROOM

## 2023-08-12 RX ADMIN — IBUPROFEN 800 MG: 400 TABLET ORAL at 06:08

## 2023-08-12 RX ADMIN — FAMOTIDINE 40 MG: 20 TABLET, FILM COATED ORAL at 08:08

## 2023-08-12 RX ADMIN — DIVALPROEX SODIUM 500 MG: 500 TABLET, FILM COATED, EXTENDED RELEASE ORAL at 08:08

## 2023-08-12 RX ADMIN — LORAZEPAM 0.5 MG: 0.5 TABLET ORAL at 02:08

## 2023-08-12 RX ADMIN — LORAZEPAM 0.5 MG: 0.5 TABLET ORAL at 08:08

## 2023-08-12 RX ADMIN — MULTIPLE VITAMINS W/ MINERALS TAB 1 TABLET: TAB at 08:08

## 2023-08-12 RX ADMIN — BUPRENORPHINE HYDROCHLORIDE, NALOXONE HYDROCHLORIDE 1 FILM: 8; 2 FILM, SOLUBLE BUCCAL; SUBLINGUAL at 02:08

## 2023-08-12 RX ADMIN — AMLODIPINE BESYLATE 5 MG: 5 TABLET ORAL at 08:08

## 2023-08-12 RX ADMIN — NICOTINE 1 PATCH: 21 PATCH, EXTENDED RELEASE TRANSDERMAL at 08:08

## 2023-08-12 RX ADMIN — BUPRENORPHINE HYDROCHLORIDE, NALOXONE HYDROCHLORIDE 1 FILM: 8; 2 FILM, SOLUBLE BUCCAL; SUBLINGUAL at 08:08

## 2023-08-12 RX ADMIN — LISINOPRIL 20 MG: 20 TABLET ORAL at 08:08

## 2023-08-12 RX ADMIN — QUETIAPINE FUMARATE 400 MG: 200 TABLET, EXTENDED RELEASE ORAL at 08:08

## 2023-08-12 NOTE — NURSING
Daily Nursing Note:      Behavior:    Patient (Keshav Godfrey is a 38 y.o. male, : 1985, MRN: [id number]) demonstrating an affect that was anxious and irritable. Keshav Decker demonstrating mood that is anxious. Keshav Decker had an appearance that was disheveled. Keshav Decker denies suicidal ideation. Keshav Decker denies suicide plan. Keshav Decker denies homicidal ideation. Keshav Decker denies hallucinations.    Keshav Decker's  height is 6' (1.829 m) and weight is 86.9 kg (191 lb 9.3 oz). His oral temperature is 97.4 °F (36.3 °C). His blood pressure is 132/86 and his pulse is 84. His respiration is 18 and oxygen saturation is 97%.         Intervention:    Encourage Keshav Decker to perform self-hygiene, grooming, and changing of clothing. Monitor Keshav Decker's behavior and program compliance. Monitor Keshav Decker for suicidal ideation, homicidal ideation, sleep disturbance, and hallucinations. Encourage Keshav Decker to eat all portions of meals and assess for meal preferences. Monitor Keshav Decker for intake and output to ensure hydration. Notify the Physician/Physician Assistant/Advance Practice Registered Nurse (MD/PA/APRN) for any medication refusal and any change in patient condition.      Response:    Keshav Decker verbalizes understand of unit process and procedures. Keshav Decker is compliant with his medications no adverse effects observed.      Plan:     Continue to monitor per MD/PA/APRN orders; maintain patient safety.

## 2023-08-12 NOTE — NURSING
Daily Nursing Note:      Behavior:    Patient (Keshav Godfrey is a 38 y.o. male, : 1985, MRN: [id number]) demonstrating an affect that was congruent. Keshav Decker demonstrating mood that is depressed and anxious. Keshav Decker had an appearance that was disheveled. Keshav Decker denies suicidal ideation. Keshav Decker denies suicide plan. Keshav Decker denies homicidal ideation. Keshav Decker denies hallucinations. He appears irritable at times.    Keshav Decker's  height is 6' (1.829 m) and weight is 86.9 kg (191 lb 9.3 oz). His oral temperature is 98 °F (36.7 °C). His blood pressure is 117/78 and his pulse is 74. His respiration is 16 and oxygen saturation is 96%.     Intervention:    Encourage Keshav Decker to perform self-hygiene, grooming, and changing of clothing. Monitor Keshav Decker's behavior and program compliance. Monitor Keshav Decker for suicidal ideation, homicidal ideation, sleep disturbance, and hallucinations. Encourage Keshav Decker to eat all portions of meals and assess for meal preferences. Monitor Keshav Decker for intake and output to ensure hydration. Notify the Physician/Physician Assistant/Advance Practice Registered Nurse (MD/PA/APRN) for any medication refusal and any change in patient condition.      Response:    Keshav Decker verbalizes understand of unit process and procedures. Keshav Decker reported medications are helping and that he feels much better than yesterday.      Plan:     Continue to monitor per MD/PA/APRN orders; maintain patient safety.

## 2023-08-12 NOTE — GROUP NOTE
Group Psychotherapy       Group Focus: Spirituality and Well-Being      Number of patients in attendance: 4    Group Start Time: 0900  Group End Time:  0945  Groups Date: 8/12/2023  Group Topic:  Behavioral Health  Group Department: [facility] - Behavioral Health Unit  Group Facilitators:  Jeffry Castillo LPN  _____________________________________________________________________    Patient Name: Keshav Godfrey  MRN: [id number]  Patient Class: IP- Psych   Admission Date\Time: 8/9/2023  7:11 PM  Hospital Length of Stay: 3  Primary Care Provider: Yifan Rodarte MD     Referred by: Behavioral Medicine Unit Treatment Team     Target symptoms: Substance Abuse, Depression, Anxiety, Mood Disorder, and Psychosis     Patient's response to treatment: N/A     Progress toward goals: N/A     Interval History: Keshav did not attend group     Diagnosis: Bipolar     Plan: Continue treatment on BMU

## 2023-08-12 NOTE — GROUP NOTE
Nursing Group       Group Focus: Symptoms Management     Number of patients in attendance: 9    Group Start Time: 1400  Group End Time:  1445  Groups Date: 8/12/2023  Group Topic:  Behavioral Health  Group Department: [facility] - Behavioral Health Unit  Group Facilitators:  Rebeca Cam RN  _____________________________________________________________________    Patient Name: Keshav Godfrey  MRN: [id number]  Patient Class: IP- Psych   Admission Date\Time: 8/9/2023  7:11 PM  Hospital Length of Stay: 3  Primary Care Provider: Yifan Rodarte MD     Referred by: Behavioral Medicine Unit Treatment Team     Target symptoms: Substance Abuse and Anxiety     Patient's response to treatment: Active Listening     Progress toward goals: Progressing adequately     Interval History: Attended with minimal self disclosure fair response     Diagnosis: Bipolar     Plan: Continue treatment on BMU

## 2023-08-12 NOTE — PLAN OF CARE
Problem: Adult Inpatient Plan of Care  Goal: Plan of Care Review  Outcome: Ongoing, Progressing  Goal: Patient-Specific Goal (Individualized)  Outcome: Ongoing, Progressing  Goal: Absence of Hospital-Acquired Illness or Injury  Outcome: Ongoing, Progressing  Goal: Optimal Comfort and Wellbeing  Outcome: Ongoing, Progressing  Goal: Readiness for Transition of Care  Outcome: Ongoing, Progressing     Problem: Violence Risk or Actual  Goal: Anger and Impulse Control  Outcome: Ongoing, Progressing     Problem: Suicide Risk  Goal: Absence of Self-Harm  Outcome: Ongoing, Progressing     Problem: Activity and Energy Impairment (Excessive Substance Use)  Goal: Optimized Energy Level (Excessive Substance Use)  Outcome: Ongoing, Progressing     Problem: Behavior Regulation Impairment (Excessive Substance Use)  Goal: Improved Behavioral Control (Excessive Substance Use)  Outcome: Ongoing, Progressing     Problem: Decreased Participation and Engagement (Excessive Substance Use)  Goal: Increased Participation and Engagement (Excessive Substance Use)  Outcome: Ongoing, Progressing     Problem: Physiologic Impairment (Excessive Substance Use)  Goal: Improved Physiologic Symptoms (Excessive Substance Use)  Outcome: Ongoing, Progressing     Problem: Social, Occupational or Functional Impairment (Excessive Substance Use)  Goal: Enhanced Social, Occupational or Functional Skills (Excessive Substance Use)  Outcome: Ongoing, Progressing

## 2023-08-13 PROCEDURE — 11400000 HC PSYCH PRIVATE ROOM

## 2023-08-13 PROCEDURE — 63600175 PHARM REV CODE 636 W HCPCS: Performed by: NURSE PRACTITIONER

## 2023-08-13 PROCEDURE — 25000003 PHARM REV CODE 250: Performed by: NURSE PRACTITIONER

## 2023-08-13 PROCEDURE — S4991 NICOTINE PATCH NONLEGEND: HCPCS | Performed by: NURSE PRACTITIONER

## 2023-08-13 PROCEDURE — 25000003 PHARM REV CODE 250: Performed by: PSYCHIATRY & NEUROLOGY

## 2023-08-13 RX ADMIN — DIVALPROEX SODIUM 500 MG: 500 TABLET, FILM COATED, EXTENDED RELEASE ORAL at 09:08

## 2023-08-13 RX ADMIN — BUPRENORPHINE HYDROCHLORIDE, NALOXONE HYDROCHLORIDE 1 FILM: 8; 2 FILM, SOLUBLE BUCCAL; SUBLINGUAL at 08:08

## 2023-08-13 RX ADMIN — IBUPROFEN 800 MG: 400 TABLET ORAL at 02:08

## 2023-08-13 RX ADMIN — LORAZEPAM 0.5 MG: 0.5 TABLET ORAL at 09:08

## 2023-08-13 RX ADMIN — FAMOTIDINE 40 MG: 20 TABLET, FILM COATED ORAL at 09:08

## 2023-08-13 RX ADMIN — AMLODIPINE BESYLATE 5 MG: 5 TABLET ORAL at 09:08

## 2023-08-13 RX ADMIN — QUETIAPINE FUMARATE 400 MG: 200 TABLET, EXTENDED RELEASE ORAL at 08:08

## 2023-08-13 RX ADMIN — HYDROXYZINE PAMOATE 50 MG: 25 CAPSULE ORAL at 05:08

## 2023-08-13 RX ADMIN — LISINOPRIL 20 MG: 20 TABLET ORAL at 09:08

## 2023-08-13 RX ADMIN — MULTIPLE VITAMINS W/ MINERALS TAB 1 TABLET: TAB at 09:08

## 2023-08-13 RX ADMIN — NICOTINE 1 PATCH: 21 PATCH, EXTENDED RELEASE TRANSDERMAL at 09:08

## 2023-08-13 RX ADMIN — LORAZEPAM 0.5 MG: 0.5 TABLET ORAL at 08:08

## 2023-08-13 RX ADMIN — BUPRENORPHINE HYDROCHLORIDE, NALOXONE HYDROCHLORIDE 1 FILM: 8; 2 FILM, SOLUBLE BUCCAL; SUBLINGUAL at 02:08

## 2023-08-13 RX ADMIN — BUPRENORPHINE HYDROCHLORIDE, NALOXONE HYDROCHLORIDE 1 FILM: 8; 2 FILM, SOLUBLE BUCCAL; SUBLINGUAL at 09:08

## 2023-08-13 RX ADMIN — DIVALPROEX SODIUM 500 MG: 500 TABLET, FILM COATED, EXTENDED RELEASE ORAL at 08:08

## 2023-08-13 NOTE — NURSING
Daily Nursing Note:      Behavior:    Patient (Keshav Godfrey is a 38 y.o. male, : 1985, MRN: [id number]) demonstrating an affect that was irritable. Keshav Decker demonstrating mood that is depressed. Keshav Decker had an appearance that was disheveled. Keshav Decker denies suicidal ideation. Keshav Decker denies suicide plan. Keshav Decker denies homicidal ideation. Keshav Decker denies hallucinations. He is withdrawn and avoids social interaction.    Keshav Decker's  height is 6' (1.829 m) and weight is 86.9 kg (191 lb 9.3 oz). His oral temperature is 97.4 °F (36.3 °C). His blood pressure is 132/86 and his pulse is 84. His respiration is 18 and oxygen saturation is 97%.     Intervention:    Encourage Keshav Decker to perform self-hygiene, grooming, and changing of clothing. Monitor Keshav Decker's behavior and program compliance. Monitor Keshav Decker for suicidal ideation, homicidal ideation, sleep disturbance, and hallucinations. Encourage Keshav Decker to eat all portions of meals and assess for meal preferences. Monitor Keshav Decker for intake and output to ensure hydration. Notify the Physician/Physician Assistant/Advance Practice Registered Nurse (MD/PA/APRN) for any medication refusal and any change in patient condition.      Response:    Keshav Decker barely responds to staff and tries to avoid interaction. He avoids eye contact and provides limited self-disclosure.  Plan:     Continue to monitor per MD/PA/APRN orders; maintain patient safety.

## 2023-08-13 NOTE — NURSING
Daily Nursing Note:      Behavior:    Patient (Keshav Godfrey is a 38 y.o. male, : 1985, MRN: [id number]) demonstrating an affect that was anxious and irritable. Keshav Decker demonstrating mood that is anxious. Keshav Decker had an appearance that was disheveled. Keshav Decker denies suicidal ideation. Keshav Decker denies suicide plan. Keshav Decker denies homicidal ideation. Keshav Decker denies hallucinations.    Keshav Decker's  height is 6' (1.829 m) and weight is 92.4 kg (203 lb 11.3 oz). His oral temperature is 97.7 °F (36.5 °C). His blood pressure is 144/90 (abnormal) and his pulse is 78. His respiration is 18 and oxygen saturation is 98%.       Intervention:    Encourage Keshav Decker to perform self-hygiene, grooming, and changing of clothing. Monitor Keshav Decker's behavior and program compliance. Monitor Keshav Decker for suicidal ideation, homicidal ideation, sleep disturbance, and hallucinations. Encourage Keshav Decker to eat all portions of meals and assess for meal preferences. Monitor Keshav Decker for intake and output to ensure hydration. Notify the Physician/Physician Assistant/Advance Practice Registered Nurse (MD/PA/APRN) for any medication refusal and any change in patient condition.      Response:    Keshav Decker has episodes of increased irritability with verbal outbursts, but they have been less frequent and he is more easily redirected. He is compliant with medications, no adverse effects observed or reported.      Plan:     Continue to monitor per MD/PA/APRN orders; maintain patient safety.

## 2023-08-13 NOTE — NURSING
""Can I have something for anxiety?  I don't know what's wrong, I just woke up like that."  Vistaril 50mg po given.  " no

## 2023-08-14 PROCEDURE — 25000003 PHARM REV CODE 250: Performed by: NURSE PRACTITIONER

## 2023-08-14 PROCEDURE — 63600175 PHARM REV CODE 636 W HCPCS: Performed by: NURSE PRACTITIONER

## 2023-08-14 PROCEDURE — S4991 NICOTINE PATCH NONLEGEND: HCPCS | Performed by: NURSE PRACTITIONER

## 2023-08-14 PROCEDURE — 25000003 PHARM REV CODE 250: Performed by: PSYCHIATRY & NEUROLOGY

## 2023-08-14 PROCEDURE — 11400000 HC PSYCH PRIVATE ROOM

## 2023-08-14 RX ORDER — DULOXETIN HYDROCHLORIDE 30 MG/1
30 CAPSULE, DELAYED RELEASE ORAL DAILY
Status: DISCONTINUED | OUTPATIENT
Start: 2023-08-14 | End: 2023-08-14

## 2023-08-14 RX ORDER — DULOXETIN HYDROCHLORIDE 60 MG/1
60 CAPSULE, DELAYED RELEASE ORAL DAILY
Status: DISCONTINUED | OUTPATIENT
Start: 2023-08-15 | End: 2023-08-16 | Stop reason: HOSPADM

## 2023-08-14 RX ORDER — DULOXETIN HYDROCHLORIDE 30 MG/1
30 CAPSULE, DELAYED RELEASE ORAL DAILY
Status: COMPLETED | OUTPATIENT
Start: 2023-08-14 | End: 2023-08-14

## 2023-08-14 RX ADMIN — ACETAMINOPHEN 650 MG: 325 TABLET, FILM COATED ORAL at 11:08

## 2023-08-14 RX ADMIN — BUPRENORPHINE HYDROCHLORIDE, NALOXONE HYDROCHLORIDE 1 FILM: 8; 2 FILM, SOLUBLE BUCCAL; SUBLINGUAL at 08:08

## 2023-08-14 RX ADMIN — FAMOTIDINE 40 MG: 20 TABLET, FILM COATED ORAL at 08:08

## 2023-08-14 RX ADMIN — DULOXETINE 30 MG: 30 CAPSULE, DELAYED RELEASE ORAL at 10:08

## 2023-08-14 RX ADMIN — IBUPROFEN 800 MG: 400 TABLET ORAL at 08:08

## 2023-08-14 RX ADMIN — NICOTINE 1 PATCH: 21 PATCH, EXTENDED RELEASE TRANSDERMAL at 08:08

## 2023-08-14 RX ADMIN — LORAZEPAM 0.5 MG: 0.5 TABLET ORAL at 08:08

## 2023-08-14 RX ADMIN — AMLODIPINE BESYLATE 5 MG: 5 TABLET ORAL at 08:08

## 2023-08-14 RX ADMIN — LISINOPRIL 20 MG: 20 TABLET ORAL at 08:08

## 2023-08-14 RX ADMIN — MULTIPLE VITAMINS W/ MINERALS TAB 1 TABLET: TAB at 08:08

## 2023-08-14 RX ADMIN — DIVALPROEX SODIUM 500 MG: 500 TABLET, FILM COATED, EXTENDED RELEASE ORAL at 08:08

## 2023-08-14 RX ADMIN — BUPRENORPHINE HYDROCHLORIDE, NALOXONE HYDROCHLORIDE 1 FILM: 8; 2 FILM, SOLUBLE BUCCAL; SUBLINGUAL at 03:08

## 2023-08-14 RX ADMIN — QUETIAPINE FUMARATE 400 MG: 200 TABLET, EXTENDED RELEASE ORAL at 08:08

## 2023-08-14 NOTE — NURSING
Daily Nursing Note:      Behavior:    Patient (Keshav Godfrey is a 38 y.o. male, : 1985, MRN: [id number]) demonstrating an affect that was congruent. Keshav Decker demonstrating mood that is depressed and anxious. Keshav Decker had an appearance that was disheveled. Keshav Decker denies suicidal ideation. Keshav Decker denies suicide plan. Keshav Decker denies homicidal ideation. Keshav Decker denies hallucinations. He is avoidant of social interaction.      Keshav Decker's  height is 6' (1.829 m) and weight is 92.4 kg (203 lb 11.3 oz). His oral temperature is 97.4 °F (36.3 °C). His blood pressure is 129/87 and his pulse is 72. His respiration is 18 and oxygen saturation is 97%.     Keshav Samaniegos last BM was noted on: 23      Intervention:    Encourage Keshav Decker to perform self-hygiene, grooming, and changing of clothing. Monitor Keshav Decker's behavior and program compliance. Monitor Keshav Decker for suicidal ideation, homicidal ideation, sleep disturbance, and hallucinations. Encourage Keshav Decker to eat all portions of meals and assess for meal preferences. Monitor Keshav Decker for intake and output to ensure hydration. Notify the Physician/Physician Assistant/Advance Practice Registered Nurse (MD/PA/APRN) for any medication refusal and any change in patient condition.      Response:    Keshav Decker verbalizes the minimum in staff interaction. Eye contact is poor and he will walk past staff as staff tries to communicate with him. Mood remains obviously irritable.      Plan:     Continue to monitor per MD/PA/APRN orders; maintain patient safety.

## 2023-08-14 NOTE — GROUP NOTE
Group Psychotherapy       Group Focus: Communication Skills      Number of patients in attendance: 7    Group Start Time: 1400  Group End Time:  1445  Groups Date: 8/13/2023  Group Topic:  Behavioral Health  Group Department: [facility] - [facility]al [facility]  Group Facilitators:  Jeffry Castillo LPN  _____________________________________________________________________    Patient Name: Keshav Godfrey  MRN: [id number]  Patient Class: IP- Psych   Admission Date\Time: 8/9/2023  7:11 PM  Hospital Length of Stay: 4  Primary Care Provider: Yifan Rodarte MD     Referred by: Behavioral Medicine Unit Treatment Team     Target symptoms: Mood Disorder     Patient's response to treatment: N/A     Progress toward goals: N/A     Interval History: Keshav did not attend group     Diagnosis: Bipolar     Plan: Continue treatment on BMU

## 2023-08-14 NOTE — GROUP NOTE
Education Group      Group Focus: Offered group on discharge planning.       Number of patients in attendance:4    Group Start Time: 0915  Group End Time:  1000  Groups Date: 8/14/2023  Group Topic:  Behavioral Health  Group Department: [facility] - [facility]  Group Facilitators:  Alicja Mehta LPN  _____________________________________________________________________    Patient Name: Keshav Godfrey  MRN: [id number]  Patient Class: IP- Psych   Admission Date\Time: 8/9/2023  7:11 PM  Hospital Length of Stay: 5  Primary Care Provider: Yifan Rodarte MD     Referred by: Behavioral Medicine Unit Treatment Team     Target symptoms:      Patient's response to treatment: did not attend     Progress toward goals:      Interval History:      Diagnosis:      Plan: Continue treatment on BMU

## 2023-08-14 NOTE — PROGRESS NOTES
"8/14/2023  Keshav Godfrey   1985   [id number]        Psychiatry Progress Note     Chief Complaint: "I'm so depressed I can't even get out the bed"    SUBJECTIVE:   Keshav Godfrey is a 38 y.o. male admitted for suicidal ideations with a plan as well as experiencing both auditory and visual hallucinations. He has not been going to group and has been very isolative. He has a flat affect and poor eye contact. His appetite is poor and he is slowly improving with hydration. He appears helpless, hopeless, and apathetic. He has not spoken with his parents since his admission here and stated, "I can't even call them; they made that clear". He has spoken with his sister. He verbalized being "extremely depressed". We discussed starting medications and he did agree. I will try cymbalta as he has a lot of body pain as well as depression. He is motivated to continue with his recovery and is not opposed to returning to rehab.        Current Medications:   Scheduled Meds:    amLODIPine  5 mg Oral Daily    buprenorphine-naloxone 8-2 mg  1 Film Sublingual TID    divalproex ER  500 mg Oral Q12H    famotidine  40 mg Oral Daily    lisinopriL  20 mg Oral Daily    multivitamin  1 tablet Oral Daily    nicotine  1 patch Transdermal Daily    QUEtiapine  400 mg Oral QHS      PRN Meds: acetaminophen, hydrOXYzine pamoate, ibuprofen, ondansetron   Psychotherapeutics (From admission, onward)      Start     Stop Route Frequency Ordered    08/11/23 2100  QUEtiapine 24 hr tablet 400 mg         -- Oral Nightly 08/11/23 1248            Allergies:   Review of patient's allergies indicates:   Allergen Reactions    Buspirone     Codeine     Penicillins         OBJECTIVE:   Vitals   Vitals:    08/14/23 0708   BP: (!) 146/101   Pulse: 86   Resp: 20   Temp: 97.8 °F (36.6 °C)        Labs/Imaging/Studies:   No results found for this or any previous visit (from the past 36 hour(s)).       Medical Review Of Systems:  A comprehensive review of systems " was negative.      Psychiatric Mental Status Exam:  General Appearance: dressed in hospital garb  Arousal: alert  Behavior: poor eye contact  Movements and Motor Activity: no abnormal involuntary movements noted; no tics, no tremors, no akathisia, no dystonia, no evidence of tardive dyskinesia; no psychomotor agitation or retardation  Orientation: oriented to person, place, and time  Speech: soft  Mood: Depressed  Affect: flat  Thought Process: intact, linear, goal-directed, organized, logical  Associations: no loosening of associations  Thought Content and Perceptions: No SI/HI, no paranoia, +auditory hallucinations  Recent and Remote Memory: intact, recent memory intact, remote memory intact; per interview/observation with patient  Attention and Concentration: intact, attentive to conversation; per interview/observation with patient  Fund of Knowledge: intact, aware of current events, vocabulary appropriate; based on history, vocabulary, fund of knowledge, syntax, grammar, and content  Insight: limited; based on understanding of severity of illness and HPI  Judgment: limited; based on patient's behavior and HPI    ASSESSMENT/PLAN:   Problems Addressed/Diagnoses:  MOOD DISORDERS; Bipolar I Disorder, Most Recent Episode Mixed: Severe With Psychotic Features (F31.64)     Alcohol use disorder severe  Methamphetamine Use disorder  Opioid use disorder in remission (on suboxone)  Past Medical History:   Diagnosis Date    Bipolar 1 disorder     Hypertension         Plan:  Add Cymbalta 30mg today for one day and then increase to 60mg daily    Expected Disposition Plan: Substance treatment program        JOHN Lock, PMHNP  Psychiatry  [facility]

## 2023-08-14 NOTE — PROGRESS NOTES
Recreation Therapy Progress Note    Date: 8 14 2023     Time: 1400 hour group session     Group Title: leisure skills group    Mood: pt isolated from the group     Behavior: isolated and refuse group    Affect: flat     Speech: normal    Cognition: alert when talk to     Participation Level: 0% pt refused indoor and out door activity    Intervention:cont to offer rt services          Recreation Therapist   Signature: maryellen baig MA CTRS

## 2023-08-14 NOTE — PROGRESS NOTES
Recreation Therapy Progress Note    Date: 8 14 2023     Time: 10:00 am     Group Title: creative expression     Mood: anxious and isolated from the group    Behavior: isolated  and oppositional     Affect: withdrawn  and anxious     Speech: normal    Cognition: alert when talk to.     Participation Level: pt refused group x 2     Intervention:cont to offer rt services.   Pt not interested in groups        Recreation Therapist   Signature: maryellen baig MA CTRS .

## 2023-08-14 NOTE — GROUP NOTE
Group Psychotherapy       Group Focus: Psychodynamic Group Psychotherapy and Life Skills      Number of patients in attendance: 5    Group Start Time: 0900  Group End Time:  0945  Groups Date: 8/14/2023  Group Topic:  Behavioral Health  Group Department: [facility]  Group Facilitators:  Talon Christopher LCSW  _____________________________________________________________________    Patient Name: Keshav Godfrey  MRN: [id number]  Patient Class: IP- Psych   Admission Date\Time: 8/9/2023  7:11 PM  Hospital Length of Stay: 5  Primary Care Provider: Yifan Rodarte MD     Referred by: Behavioral Medicine Unit Treatment Team     Target symptoms: Substance Abuse     Patient's response to treatment: Not Participating     Progress toward goals: Progressing slowly     Interval History: Pt did not attend group     Diagnosis:      Plan: Continue treatment on BMU

## 2023-08-15 PROCEDURE — 25000003 PHARM REV CODE 250: Performed by: NURSE PRACTITIONER

## 2023-08-15 PROCEDURE — 63600175 PHARM REV CODE 636 W HCPCS: Performed by: NURSE PRACTITIONER

## 2023-08-15 PROCEDURE — 25000003 PHARM REV CODE 250: Performed by: PSYCHIATRY & NEUROLOGY

## 2023-08-15 PROCEDURE — 11400000 HC PSYCH PRIVATE ROOM

## 2023-08-15 RX ORDER — LISINOPRIL 20 MG/1
20 TABLET ORAL DAILY
Qty: 30 TABLET | Refills: 0 | Status: SHIPPED | OUTPATIENT
Start: 2023-08-15 | End: 2023-09-14

## 2023-08-15 RX ORDER — FAMOTIDINE 40 MG/1
40 TABLET, FILM COATED ORAL DAILY
Qty: 30 TABLET | Refills: 0 | Status: SHIPPED | OUTPATIENT
Start: 2023-08-15 | End: 2023-09-14

## 2023-08-15 RX ORDER — DULOXETIN HYDROCHLORIDE 60 MG/1
60 CAPSULE, DELAYED RELEASE ORAL DAILY
Qty: 30 CAPSULE | Refills: 0 | Status: SHIPPED | OUTPATIENT
Start: 2023-08-15 | End: 2023-09-14

## 2023-08-15 RX ORDER — BUPRENORPHINE AND NALOXONE 8; 2 MG/1; MG/1
1 FILM, SOLUBLE BUCCAL; SUBLINGUAL 3 TIMES DAILY
Qty: 90 FILM | Refills: 0
Start: 2023-08-15 | End: 2023-09-14

## 2023-08-15 RX ORDER — DIVALPROEX SODIUM 500 MG/1
500 TABLET, FILM COATED, EXTENDED RELEASE ORAL EVERY 12 HOURS
Qty: 60 TABLET | Refills: 0 | Status: SHIPPED | OUTPATIENT
Start: 2023-08-15 | End: 2023-09-14

## 2023-08-15 RX ORDER — QUETIAPINE 400 MG/1
400 TABLET, FILM COATED, EXTENDED RELEASE ORAL NIGHTLY
Qty: 30 TABLET | Refills: 0 | Status: SHIPPED | OUTPATIENT
Start: 2023-08-15 | End: 2023-09-14

## 2023-08-15 RX ORDER — AMLODIPINE BESYLATE 5 MG/1
5 TABLET ORAL DAILY
Qty: 30 TABLET | Refills: 0 | Status: SHIPPED | OUTPATIENT
Start: 2023-08-15 | End: 2023-09-14

## 2023-08-15 RX ORDER — IBUPROFEN 200 MG
1 TABLET ORAL DAILY
Qty: 28 PATCH | Refills: 0 | Status: SHIPPED | OUTPATIENT
Start: 2023-08-15 | End: 2023-09-12

## 2023-08-15 RX ADMIN — FAMOTIDINE 40 MG: 20 TABLET, FILM COATED ORAL at 09:08

## 2023-08-15 RX ADMIN — QUETIAPINE FUMARATE 400 MG: 200 TABLET, EXTENDED RELEASE ORAL at 08:08

## 2023-08-15 RX ADMIN — HYDROXYZINE PAMOATE 50 MG: 25 CAPSULE ORAL at 04:08

## 2023-08-15 RX ADMIN — AMLODIPINE BESYLATE 5 MG: 5 TABLET ORAL at 09:08

## 2023-08-15 RX ADMIN — MULTIPLE VITAMINS W/ MINERALS TAB 1 TABLET: TAB at 09:08

## 2023-08-15 RX ADMIN — IBUPROFEN 800 MG: 400 TABLET ORAL at 04:08

## 2023-08-15 RX ADMIN — DIVALPROEX SODIUM 500 MG: 500 TABLET, FILM COATED, EXTENDED RELEASE ORAL at 08:08

## 2023-08-15 RX ADMIN — LISINOPRIL 20 MG: 20 TABLET ORAL at 09:08

## 2023-08-15 RX ADMIN — DIVALPROEX SODIUM 500 MG: 500 TABLET, FILM COATED, EXTENDED RELEASE ORAL at 09:08

## 2023-08-15 RX ADMIN — BUPRENORPHINE HYDROCHLORIDE, NALOXONE HYDROCHLORIDE 1 FILM: 8; 2 FILM, SOLUBLE BUCCAL; SUBLINGUAL at 09:08

## 2023-08-15 RX ADMIN — DULOXETINE HYDROCHLORIDE 60 MG: 60 CAPSULE, DELAYED RELEASE ORAL at 09:08

## 2023-08-15 RX ADMIN — BUPRENORPHINE HYDROCHLORIDE, NALOXONE HYDROCHLORIDE 1 FILM: 8; 2 FILM, SOLUBLE BUCCAL; SUBLINGUAL at 02:08

## 2023-08-15 NOTE — CARE UPDATE
Dorothy called @1518PM, Keshav's sister stating she was coming here with the parents for Keshav to sign the title to his truck to turn it back over to his parents. I told Dorothy we do not get involve with family affairs and that if she'd come up here with any hostility that the police will get called. She stated that she wanted to drop off his house keys and she was advised to go to the ED that security was going to deliver the keys here for him, that they were not allowed on the unit. Mike was notified about the incident, the Charge Nurse Delilah was notified.

## 2023-08-15 NOTE — NURSING
Treatment Team Note:      Behavior:    Patient (Keshav Godfrey is a 38 y.o. male, : 1985, MRN: [id number]) demonstrating an affect that was flat. Keshav Decker demonstrating mood that is depressed and pleasant and appropriate. Keshav Decker had an appearance that was clean. Keshav Decker denies suicidal ideation. Keshav Decker denies suicide plan. Keshav Decker denies hallucinations.      Intervention:    Will be discharged tomorrow to his home.  He states that his sister will be able to transport him;  wants to go to his apartment for his things then he will go to rehab from there.  All information was provided regarding how to contact the rehab facility.  His meds were e scripted to [facility] per patient's request.        Response:    Keshav Decker is agreeable to discharge plan.      Plan:     Continue to monitor per MD/PA/APRN orders; maintain patient safety.

## 2023-08-15 NOTE — PROGRESS NOTES
Recreation Therapy Progress Note    Date: 8  15  2023     Time: 10:00 am group session     Group Title: creative expression     Mood: isolative and withdrawn from peers and staff    Behavior: isolated in his room     Affect: flat   normal    Cognition: alert and just focused on dc     Participation Level: 0% pt refuses groups    Intervention:cont to  offer rt services          Recreation Therapist   Signature: Evangelina baig MA CTRS

## 2023-08-15 NOTE — ASSESSMENT & PLAN NOTE
Mood has stabilized. No active psychosis. No si/hi and not acute danger to self or others. Reviewed risk of relapsed and he refuses rehab. Discharge home tomorrow. Per , hehas current script for suboxone  So no additional script will be given.

## 2023-08-15 NOTE — PROGRESS NOTES
Recreation Therapy Progress Note    Date: 8  15  2023     Time: 1400 hour group session    Group Title: leisure skills     Mood: isolated  from groups and refused    Behavior: oppositional to unit groups that would help him . Pt just focus on dc.     Affect: anxious  for dc     Speech: normal    Cognition: alert when talk to     Participation Level: 0%     Intervention:pt will be dc soon . Pt did not complete treatment plan goals for rt services . Pt refused 98% of groups. Pt not interested in group therapy. Pt just focus on dc          Recreation Therapist   Signature: Evangelina baig MA CTRS

## 2023-08-15 NOTE — NURSING
PRN Administration Note:    Behavior:    Patient (Keshav Godfrey is a 38 y.o. male, : 1985, MRN: [id number])     Allergies: Buspirone, Codeine, and Penicillins    Keshav Decker's  height is 6' (1.829 m) and weight is 92.4 kg (203 lb 11.3 oz). His oral temperature is 98.3 °F (36.8 °C). His blood pressure is 105/72 and his pulse is 79. His respiration is 18 and oxygen saturation is 98%.     Reason for PRN Administration: Patient complaints of back pain; pain level 10 out of 10. Patient also verbalized increased anxiety level.    Intervention:    Administered Ibuprofen 800mg PO and Vistaril 50mg PO per physician order to Keshav Decker       Response:    Keshav Decker tolerated administration well.      Plan:     Continue to monitor per MD/PA/APRN orders; and reevaluate medication effectiveness within 30 minutes.

## 2023-08-15 NOTE — PROGRESS NOTES
"[facility]  Psychiatry  Progress Note    Patient Name: Kesahv Godfrey  MRN: [id number]   Code Status: Full Code  Admission Date: 8/9/2023  Hospital Length of Stay: 6 days  Expected Discharge Date: 8/16/2023  Attending Physician: Mauricio Funk MD  Primary Care Provider: Yifan Rodaret MD    Current Legal Status: PEc    Patient information was obtained from patient.       Subjective:     Patient is a 38 y.o., male, presents with:    Principal Problem:Bipolar affective disorder, mixed, severe, with psychotic behavior    Chief Complaint: good    HPI: Keshav Godfrey is a 38 y.o. male placed under a P[facility] at [facility] for suicidal ideations with a plan to drive himself into traffic. He also reported both auditory and visual hallucinations as well as medication noncompliance. He was irritable and agitated on presentation and stated that he felt "horrible". He reports that he was living in a sober living house for the past 10 months and has been clean and sober. His family started talking to him again and also helped him to get a vehicle. After 10 months he left the sober living house and returned to his apartment. "They were so mad at me, they refused to even speak to me". He lasted two weeks and then relapsed. "I felt like I was losing my mind. I was hearing voices all the time and I just can't deal with it". He admits to drinking a half gallon a day for the past few weeks. He also did methamphetamines for the first time in a few weeks. He had stayed compliant with his medications as well as his suboxone despite the relapse. He also reports that he has never had hallucinations but that they started several months ago and has never stopped. He has struggled with methamphetamines and opioids his entire life and only recently started drinking. He endorses depression, anger, anxiety, and remorse over the relapse. He is very angry with himself over it. He has had a few episodes of " "impulsive and inappropriate behavior on the unit thus far. We did discuss the consequences of this if it continues.       Hospital Course: The patient was admitted to the psychiatric unit and monitored for safety. The medications were reconciled. A history and physical was completed by the primary care doctor and medical issues were addressed. The patient was provided with individual and group therapy. On admit, pt was restarted on home med regimen including Depakote, Seroquel, Suboxone and started on Ativan for detox sx.     8/11/23-Staff reports pt was agitated and nonredirectable on 8/10, given Haldol and benadryl which was effective. For interview today, pt is guarded with irritable affect and demeanor. His responses are juan and superficial, lacking in much detail. His primary complaint today is "I just want to sleep." He reports mood is anxious and depressed. He continues to c/o AH - "Its like these voices telling me something bad happened to my family." States at times, he believes what the voices are telling him suggestive of mild underlying paranoia, though TP is  inear and he is no preoccupied with delusional TC. Reports sleep has been fair. He denies any s/e of current med regimen. Denies current SI/HI/plan/intent. His vitals are wnl and he does not appear tremulous, no objective s/s of etoh w/d. Agrees with increasing seroquel for mood and AH. He is interested in residential rehab - has phone interview with [facility] rehab today.     8/14/23-He has not been going to group and has been very isolative. He has a flat affect and poor eye contact. His appetite is poor and he is slowly improving with hydration. He appears helpless, hopeless, and apathetic. He has not spoken with his parents since his admission here and stated, "I can't even call them; they made that clear". He has spoken with his sister. He verbalized being "extremely depressed". We discussed starting medications and he did agree. I will try " cymbalta as he has a lot of body pain as well as depression. He is motivated to continue with his recovery and is not opposed to returning to rehab.     8/15/23-Seen for treatment team. He has been taking meds but was upset this morning and refused them. He was planning to go to rehab from here but now says he has to go home after discharge. He says he will go to AA meetings and may go to rehab in a few days. He has been less labile and less irritable overall. Mood improved. No si/hi and no psychosis currently. Sleep disrupted and eating ok. Tolerating meds. He is likely at baseline and will plan for discharge home tomorrow. Says he can go to sister's house and his car is there. He plans to return home to [place] afterwards.      Psychiatric Mental Status Exam:  General Appearance: appears stated age, well-developed, well-nourished  Arousal: alert  Behavior: cooperative  Movements and Motor Activity: no abnormal involuntary movements noted  Orientation: oriented to person, place, time, and situation  Speech: normal rate, normal rhythm, normal volume, normal tone  Mood: euthymic  Affect: constricted  Thought Process: linear  Associations: intact  Thought Content and Perceptions: no suicidal ideation, no homicidal ideation, no auditory hallucinations, no visual hallucinations, no paranoid ideation, no ideas of reference  Recent and Remote Memory: recent memory intact, remote memory intact; per interview/observation with patient  Attention and Concentration: intact, attentive to conversation; per interview/observation with patient  Fund of Knowledge: intact, aware of current events, vocabulary appropriate; based on history, vocabulary, fund of knowledge, syntax, grammar, and content  Insight: intact; based on understanding of severity of illness and HPI  Judgment: intact; based on patient's behavior and HPI      Family History    None       Tobacco Use    Smoking status: Every Day     Current packs/day: 0.00     Types:  Cigarettes    Smokeless tobacco: Not on file   Substance and Sexual Activity    Alcohol use: Not Currently    Drug use: Yes     Types: Methamphetamines     Comment: Methadone    Sexual activity: Not on file     Psychotherapeutics (From admission, onward)      Start     Stop Route Frequency Ordered    08/15/23 0900  DULoxetine DR capsule 60 mg         -- Oral Daily 08/14/23 0939    08/11/23 2100  QUEtiapine 24 hr tablet 400 mg         -- Oral Nightly 08/11/23 1248             Review of Systems  Objective:     Vital Signs (Most Recent):  Temp: 98.5 °F (36.9 °C) (08/15/23 0629)  Pulse: 61 (08/15/23 0629)  Resp: 20 (08/15/23 0629)  BP: (!) 144/91 (08/15/23 0629)  SpO2: 95 % (08/15/23 0629) Vital Signs (24h Range):  Temp:  [98.1 °F (36.7 °C)-98.5 °F (36.9 °C)] 98.5 °F (36.9 °C)  Pulse:  [61-69] 61  Resp:  [18-20] 20  SpO2:  [95 %-99 %] 95 %  BP: (133-144)/(88-91) 144/91     Height: 6' (182.9 cm)  Weight: 92.4 kg (203 lb 11.3 oz)  Body mass index is 27.63 kg/m².    No intake or output data in the 24 hours ending 08/15/23 0915     Physical Exam        Significant Labs: Last 72 Hours:   Recent Lab Results       None            Significant Imaging: I have reviewed all pertinent imaging results/findings within the past 24 hours.       Scheduled Medications:   amLODIPine  5 mg Oral Daily    buprenorphine-naloxone 8-2 mg  1 Film Sublingual TID    divalproex ER  500 mg Oral Q12H    DULoxetine  60 mg Oral Daily    famotidine  40 mg Oral Daily    lisinopriL  20 mg Oral Daily    multivitamin  1 tablet Oral Daily    nicotine  1 patch Transdermal Daily    QUEtiapine  400 mg Oral QHS       PRN Medications:  acetaminophen, hydrOXYzine pamoate, ibuprofen, ondansetron    Review of patient's allergies indicates:   Allergen Reactions    Buspirone     Codeine     Penicillins        Assessment/Plan:     * Bipolar affective disorder, mixed, severe, with psychotic behavior  Mood has stabilized. No active psychosis. No si/hi and  not acute danger to self or others. Reviewed risk of relapsed and he refuses rehab. Discharge home tomorrow. Per , hehas current script for suboxone  So no additional script will be given.         Need for Continued Hospitalization:  Patient stabilized and ready for discharge from inpatient psychiatric unit.    Anticipated Disposition:  Home or Self Care      Mauricio Funk MD   Psychiatry  [facility]

## 2023-08-15 NOTE — NURSING
PRN Medication Follow-up Note:    Behavior:    Patient (Keshav Godfrey is a 38 y.o. male, : 1985, MRN: [id number])     Allergies: Buspirone, Codeine, and Penicillins    Keshav Decker's  height is 6' (1.829 m) and weight is 92.4 kg (203 lb 11.3 oz). His oral temperature is 98.3 °F (36.8 °C). His blood pressure is 105/72 and his pulse is 79. His respiration is 18 and oxygen saturation is 98%.     Administered Ibuprofen 800mg PO and Vistaril 50mg PO per physician order to Keshav Decker         Response:    Keshav Decker's response: Patient verbalized some relief of back pain; patient able to ambulate without distress. Patient also appears less anxious at this time.      Plan:     Continue to monitor per MD/PA/APRN orders; and reevaluate medication effectiveness within 30 minutes.    Symptomatic Bradycardia

## 2023-08-15 NOTE — NURSING
Patient awake and complaining of a headache on a pain scale of 8/10. Administered Tylenol 650mg PO for headache. Meds tolerated well, no adverse reactions noted.      0019 PRN follow-up: Headache resolved; patient asleep in room after taking meds. No further complaints.

## 2023-08-15 NOTE — CARE UPDATE
Transportation was set 8/15/23 @ 1559pm with [facility] transportation for tomorrow 8/16/23, leaving from [facility] going to [address] [place] [address] [address] [facility] [id number]. The trip ride# [id number]

## 2023-08-15 NOTE — PATIENT CARE CONFERENCE
Spoke with pt's sister.  She states family is concerned that if he goes out and gets his truck that he will not follow up or go to treatment.  She also stated that she thinks he will go get a loan on his truck and use the money to buy drugs.  I advised her that while I understood her concerns that once he leaves this unit that I have no control over what he does.  She stated the family is going to call him and try to convince him to go to rehab from here.

## 2023-08-15 NOTE — SUBJECTIVE & OBJECTIVE
Psychiatric Mental Status Exam:  General Appearance: appears stated age, well-developed, well-nourished  Arousal: alert  Behavior: cooperative  Movements and Motor Activity: no abnormal involuntary movements noted  Orientation: oriented to person, place, time, and situation  Speech: normal rate, normal rhythm, normal volume, normal tone  Mood: euthymic  Affect: constricted  Thought Process: linear  Associations: intact  Thought Content and Perceptions: no suicidal ideation, no homicidal ideation, no auditory hallucinations, no visual hallucinations, no paranoid ideation, no ideas of reference  Recent and Remote Memory: recent memory intact, remote memory intact; per interview/observation with patient  Attention and Concentration: intact, attentive to conversation; per interview/observation with patient  Fund of Knowledge: intact, aware of current events, vocabulary appropriate; based on history, vocabulary, fund of knowledge, syntax, grammar, and content  Insight: intact; based on understanding of severity of illness and HPI  Judgment: intact; based on patient's behavior and HPI      Family History    None       Tobacco Use    Smoking status: Every Day     Current packs/day: 0.00     Types: Cigarettes    Smokeless tobacco: Not on file   Substance and Sexual Activity    Alcohol use: Not Currently    Drug use: Yes     Types: Methamphetamines     Comment: Methadone    Sexual activity: Not on file     Psychotherapeutics (From admission, onward)      Start     Stop Route Frequency Ordered    08/15/23 0900  DULoxetine DR capsule 60 mg         -- Oral Daily 08/14/23 0939    08/11/23 2100  QUEtiapine 24 hr tablet 400 mg         -- Oral Nightly 08/11/23 1248             Review of Systems  Objective:     Vital Signs (Most Recent):  Temp: 98.5 °F (36.9 °C) (08/15/23 0629)  Pulse: 61 (08/15/23 0629)  Resp: 20 (08/15/23 0629)  BP: (!) 144/91 (08/15/23 0629)  SpO2: 95 % (08/15/23 0629) Vital Signs (24h Range):  Temp:  [98.1 °F  (36.7 °C)-98.5 °F (36.9 °C)] 98.5 °F (36.9 °C)  Pulse:  [61-69] 61  Resp:  [18-20] 20  SpO2:  [95 %-99 %] 95 %  BP: (133-144)/(88-91) 144/91     Height: 6' (182.9 cm)  Weight: 92.4 kg (203 lb 11.3 oz)  Body mass index is 27.63 kg/m².    No intake or output data in the 24 hours ending 08/15/23 0915     Physical Exam        Significant Labs: Last 72 Hours:   Recent Lab Results       None            Significant Imaging: I have reviewed all pertinent imaging results/findings within the past 24 hours.

## 2023-08-16 VITALS
DIASTOLIC BLOOD PRESSURE: 102 MMHG | BODY MASS INDEX: 27.59 KG/M2 | WEIGHT: 203.69 LBS | TEMPERATURE: 98 F | OXYGEN SATURATION: 100 % | SYSTOLIC BLOOD PRESSURE: 152 MMHG | RESPIRATION RATE: 20 BRPM | HEART RATE: 68 BPM | HEIGHT: 72 IN

## 2023-08-16 LAB — T PALLIDUM AB SER QL: REACTIVE

## 2023-08-16 PROCEDURE — 63600175 PHARM REV CODE 636 W HCPCS: Performed by: NURSE PRACTITIONER

## 2023-08-16 PROCEDURE — 25000003 PHARM REV CODE 250: Performed by: PSYCHIATRY & NEUROLOGY

## 2023-08-16 PROCEDURE — 25000003 PHARM REV CODE 250: Performed by: NURSE PRACTITIONER

## 2023-08-16 RX ADMIN — AMLODIPINE BESYLATE 5 MG: 5 TABLET ORAL at 08:08

## 2023-08-16 RX ADMIN — DIVALPROEX SODIUM 500 MG: 500 TABLET, FILM COATED, EXTENDED RELEASE ORAL at 08:08

## 2023-08-16 RX ADMIN — DULOXETINE HYDROCHLORIDE 60 MG: 60 CAPSULE, DELAYED RELEASE ORAL at 08:08

## 2023-08-16 RX ADMIN — LISINOPRIL 20 MG: 20 TABLET ORAL at 08:08

## 2023-08-16 RX ADMIN — BUPRENORPHINE HYDROCHLORIDE, NALOXONE HYDROCHLORIDE 1 FILM: 8; 2 FILM, SOLUBLE BUCCAL; SUBLINGUAL at 08:08

## 2023-08-16 RX ADMIN — FAMOTIDINE 40 MG: 20 TABLET, FILM COATED ORAL at 08:08

## 2023-08-16 RX ADMIN — MULTIPLE VITAMINS W/ MINERALS TAB 1 TABLET: TAB at 08:08

## 2023-08-16 NOTE — NURSING
Patient discharged home via [employer] Transportation in stable condition. Patient denied suicidal ideation, homicidal ideation, or hallucinations. Patient was discharged with valuables, personal belongings, prescriptions, discharge instructions, and an educational handout explaining the diagnosis and prescribed medications. Patient verbalized understanding of the discharge instructions and importance of follow-up visits.

## 2023-08-16 NOTE — DISCHARGE SUMMARY
"[facility]  Psychiatry  Discharge Summary      Patient Name: Keshav Godfrey  MRN: [id number]  Admission Date: 8/9/2023  Hospital Length of Stay: 7 days  Discharge Date and Time: 8/16/2023  8:05 AM  Attending Physician: No att. providers found   Discharging Provider: Mauricio Funk MD  Primary Care Provider: Yifan Rodarte MD    HPI:   Keshav Godfrey is a 38 y.o. male placed under a PEC at [facility] for suicidal ideations with a plan to drive himself into traffic. He also reported both auditory and visual hallucinations as well as medication noncompliance. He was irritable and agitated on presentation and stated that he felt "horrible". He reports that he was living in a sober living house for the past 10 months and has been clean and sober. His family started talking to him again and also helped him to get a vehicle. After 10 months he left the sober living house and returned to his apartment. "They were so mad at me, they refused to even speak to me". He lasted two weeks and then relapsed. "I felt like I was losing my mind. I was hearing voices all the time and I just can't deal with it". He admits to drinking a half gallon a day for the past few weeks. He also did methamphetamines for the first time in a few weeks. He had stayed compliant with his medications as well as his suboxone despite the relapse. He also reports that he has never had hallucinations but that they started several months ago and has never stopped. He has struggled with methamphetamines and opioids his entire life and only recently started drinking. He endorses depression, anger, anxiety, and remorse over the relapse. He is very angry with himself over it. He has had a few episodes of impulsive and inappropriate behavior on the unit thus far. We did discuss the consequences of this if it continues.       Hospital Course:   The patient was admitted to the psychiatric unit and monitored for safety. The " "medications were reconciled. A history and physical was completed by the primary care doctor and medical issues were addressed. The patient was provided with individual and group therapy. On admit, pt was restarted on home med regimen including Depakote, Seroquel, Suboxone and started on Ativan for detox sx.     8/11/23-Staff reports pt was agitated and nonredirectable on 8/10, given Haldol and benadryl which was effective. For interview today, pt is guarded with irritable affect and demeanor. His responses are juan and superficial, lacking in much detail. His primary complaint today is "I just want to sleep." He reports mood is anxious and depressed. He continues to c/o AH - "Its like these voices telling me something bad happened to my family." States at times, he believes what the voices are telling him suggestive of mild underlying paranoia, though TP is  inear and he is no preoccupied with delusional TC. Reports sleep has been fair. He denies any s/e of current med regimen. Denies current SI/HI/plan/intent. His vitals are wnl and he does not appear tremulous, no objective s/s of etoh w/d. Agrees with increasing seroquel for mood and AH. He is interested in residential rehab - has phone interview with [facility] rehab today.     8/14/23-He has not been going to group and has been very isolative. He has a flat affect and poor eye contact. His appetite is poor and he is slowly improving with hydration. He appears helpless, hopeless, and apathetic. He has not spoken with his parents since his admission here and stated, "I can't even call them; they made that clear". He has spoken with his sister. He verbalized being "extremely depressed". We discussed starting medications and he did agree. I will try cymbalta as he has a lot of body pain as well as depression. He is motivated to continue with his recovery and is not opposed to returning to rehab.     8/15/23-Seen for treatment team. He has been taking meds but was " upset this morning and refused them. He was planning to go to rehab from here but now says he has to go home after discharge. He says he will go to AA meetings and may go to rehab in a few days. He has been less labile and less irritable overall. Mood improved. No si/hi and no psychosis currently. Sleep disrupted and eating ok. Tolerating meds. He is likely at baseline and will plan for discharge home tomorrow. Says he can go to sister's house and his car is there. He plans to return home to [place] afterwards.       Goals of Care Treatment Preferences:  Code Status: Full Code      * No surgery found *     Consults:   Physical Exam     Significant Labs:   Last 72 Hours:   Recent Lab Results     None          Significant Imaging: I have reviewed all pertinent imaging results/findings within the past 24 hours.    Smoking Cessation:   Does the patient smoke? Yes  Does the patient want a prescription for Smoking Cessation? Yes  Does the patient want counseling for Smoking Cessation? No         Pending Diagnostic Studies:     Procedure Component Value Units Date/Time    Syphilis Ab, SRIDHAR [[id number]] Collected: 08/11/23 0524    Order Status: Sent Lab Status: In process Updated: 08/11/23 1154    Specimen: Blood         Final Active Diagnoses:    Diagnosis Date Noted POA    PRINCIPAL PROBLEM:  Bipolar affective disorder, mixed, severe, with psychotic behavior [F31.64] 08/10/2023 Yes    Alcohol dependence [F10.20] 08/11/2023 Yes    Tobacco abuse [Z72.0] 08/10/2023 Yes    Methamphetamine use [F15.10] 08/11/2022 Yes    Essential hypertension [I10] 08/07/2022 Yes     Chronic      Problems Resolved During this Admission:      No new Assessment & Plan notes have been filed under this hospital service since the last note was generated.  Service: Psychiatry      Functional Condition: Independent ambulation    Discharged Condition: fair    Disposition: Home or Self Care    Follow Up:   Follow-up Information     [facility] Follow  up on 8/18/2023.    Contact information:  [address] [facility]    [phone]                     Patient Instructions:   No discharge procedures on file.  Medications:  Reconciled Home Medications:      Medication List      START taking these medications    buprenorphine-naloxone 8-2 mg 8-2 mg  Commonly known as: SUBOXONE  Place 1 Film under the tongue 3 (three) times daily.     divalproex 500 MG Tb24  Take 1 tablet (500 mg total) by mouth every 12 (twelve) hours.  Replaces: divalproex 250 MG EC tablet     DULoxetine 60 MG capsule  Commonly known as: CYMBALTA  Take 1 capsule (60 mg total) by mouth once daily.     famotidine 40 MG tablet  Commonly known as: PEPCID  Take 1 tablet (40 mg total) by mouth once daily.     nicotine 21 mg/24 hr  Commonly known as: NICODERM CQ  Place 1 patch onto the skin once daily.     QUEtiapine 400 MG Tb24  Commonly known as: SEROQUEL XR  Take 1 tablet (400 mg total) by mouth every evening.  Replaces: QUEtiapine 50 MG tablet        CHANGE how you take these medications    lisinopriL 20 MG tablet  Commonly known as: PRINIVIL,ZESTRIL  Take 1 tablet (20 mg total) by mouth once daily.  What changed:   · medication strength  · how much to take        CONTINUE taking these medications    amLODIPine 5 MG tablet  Commonly known as: NORVASC  Take 1 tablet (5 mg total) by mouth once daily.        STOP taking these medications    divalproex 250 MG EC tablet  Commonly known as: DEPAKOTE  Replaced by: divalproex 500 MG Tb24     hydrOXYzine HCL 25 MG tablet  Commonly known as: ATARAX     pantoprazole 40 MG tablet  Commonly known as: PROTONIX     QUEtiapine 300 MG Tab  Commonly known as: SEROQUEL     QUEtiapine 50 MG tablet  Commonly known as: SEROQUEL  Replaced by: QUEtiapine 400 MG Tb24          Is patient being discharged on multiple antipsychotics? No         Mauricio Funk MD  Psychiatry  [facility]

## 2023-08-16 NOTE — NURSING
Daily Nursing Note:      Behavior:    Patient (Keshav Godfrey is a 38 y.o. male, : 1985, MRN: [id number]) demonstrating an affect that was congruent. Keshav Decker demonstrating mood that is depressed and anxious. Keshav Decker had an appearance that was disheveled. Keshav Decker denies suicidal ideation. Keshav Decker denies suicide plan. Keshav Decker denies homicidal ideation. Keshav Decker denies hallucinations.    Keshav Decker's  height is 6' (1.829 m) and weight is 92.4 kg (203 lb 11.3 oz). His oral temperature is 97.8 °F (36.6 °C). His blood pressure is 152/102 (abnormal) and his pulse is 68. His respiration is 20 and oxygen saturation is 100%.       Intervention:    Encourage Keshav Decker to perform self-hygiene, grooming, and changing of clothing. Monitor Keshav Decker's behavior and program compliance. Monitor Keshav Decker for suicidal ideation, homicidal ideation, sleep disturbance, and hallucinations. Encourage Keshav Decker to eat all portions of meals and assess for meal preferences. Monitor Keshav Decker for intake and output to ensure hydration. Notify the Physician/Physician Assistant/Advance Practice Registered Nurse (MD/PA/APRN) for any medication refusal and any change in patient condition.      Response:    Keshav Decker verbalizes understand of unit process and procedures.   Plan:     Continue to monitor per MD/PA/APRN orders; maintain patient safety.